# Patient Record
Sex: MALE | Race: WHITE | NOT HISPANIC OR LATINO | Employment: FULL TIME | URBAN - METROPOLITAN AREA
[De-identification: names, ages, dates, MRNs, and addresses within clinical notes are randomized per-mention and may not be internally consistent; named-entity substitution may affect disease eponyms.]

---

## 2018-11-02 ENCOUNTER — TELEPHONE (OUTPATIENT)
Dept: FAMILY MEDICINE CLINIC | Facility: CLINIC | Age: 48
End: 2018-11-02

## 2018-11-06 ENCOUNTER — TELEPHONE (OUTPATIENT)
Dept: FAMILY MEDICINE CLINIC | Facility: CLINIC | Age: 48
End: 2018-11-06

## 2018-11-07 DIAGNOSIS — Z00.00 ROUTINE HEALTH MAINTENANCE: Primary | ICD-10-CM

## 2018-11-07 DIAGNOSIS — E55.9 VITAMIN D DEFICIENCY: ICD-10-CM

## 2018-11-07 DIAGNOSIS — Z12.5 SPECIAL SCREENING FOR MALIGNANT NEOPLASM OF PROSTATE: ICD-10-CM

## 2018-11-07 PROBLEM — K52.9 CHRONIC DIARRHEA: Status: ACTIVE | Noted: 2018-11-07

## 2018-11-07 PROBLEM — K21.9 ACID REFLUX: Status: ACTIVE | Noted: 2018-11-07

## 2018-11-07 PROBLEM — J30.2 ALLERGIC RHINITIS, SEASONAL: Status: ACTIVE | Noted: 2018-11-07

## 2018-11-27 LAB
25(OH)D3+25(OH)D2 SERPL-MCNC: 21.6 NG/ML (ref 30–100)
ALBUMIN SERPL-MCNC: 4.4 G/DL (ref 3.5–5.5)
ALBUMIN/GLOB SERPL: 1.5 {RATIO} (ref 1.2–2.2)
ALP SERPL-CCNC: 69 IU/L (ref 39–117)
ALT SERPL-CCNC: 14 IU/L (ref 0–44)
AST SERPL-CCNC: 20 IU/L (ref 0–40)
BASOPHILS # BLD AUTO: 0 X10E3/UL (ref 0–0.2)
BASOPHILS NFR BLD AUTO: 0 %
BILIRUB SERPL-MCNC: 0.3 MG/DL (ref 0–1.2)
BUN SERPL-MCNC: 16 MG/DL (ref 6–24)
BUN/CREAT SERPL: 16 (ref 9–20)
CALCIUM SERPL-MCNC: 9.1 MG/DL (ref 8.7–10.2)
CHLORIDE SERPL-SCNC: 105 MMOL/L (ref 96–106)
CHOLEST SERPL-MCNC: 218 MG/DL (ref 100–199)
CO2 SERPL-SCNC: 22 MMOL/L (ref 20–29)
CREAT SERPL-MCNC: 1 MG/DL (ref 0.76–1.27)
EOSINOPHIL # BLD AUTO: 0.7 X10E3/UL (ref 0–0.4)
EOSINOPHIL NFR BLD AUTO: 10 %
ERYTHROCYTE [DISTWIDTH] IN BLOOD BY AUTOMATED COUNT: 13.1 % (ref 12.3–15.4)
GLOBULIN SER CALC-MCNC: 2.9 G/DL (ref 1.5–4.5)
GLUCOSE SERPL-MCNC: 121 MG/DL (ref 65–99)
HCT VFR BLD AUTO: 42.7 % (ref 37.5–51)
HDLC SERPL-MCNC: 39 MG/DL
HGB BLD-MCNC: 14.8 G/DL (ref 13–17.7)
IMM GRANULOCYTES # BLD: 0 X10E3/UL (ref 0–0.1)
IMM GRANULOCYTES NFR BLD: 0 %
LDLC SERPL CALC-MCNC: 145 MG/DL (ref 0–99)
LYMPHOCYTES # BLD AUTO: 1.5 X10E3/UL (ref 0.7–3.1)
LYMPHOCYTES NFR BLD AUTO: 21 %
MCH RBC QN AUTO: 29.6 PG (ref 26.6–33)
MCHC RBC AUTO-ENTMCNC: 34.7 G/DL (ref 31.5–35.7)
MCV RBC AUTO: 85 FL (ref 79–97)
MONOCYTES # BLD AUTO: 0.7 X10E3/UL (ref 0.1–0.9)
MONOCYTES NFR BLD AUTO: 9 %
NEUTROPHILS # BLD AUTO: 4.1 X10E3/UL (ref 1.4–7)
NEUTROPHILS NFR BLD AUTO: 60 %
PLATELET # BLD AUTO: 208 X10E3/UL (ref 150–379)
POTASSIUM SERPL-SCNC: 4.5 MMOL/L (ref 3.5–5.2)
PROT SERPL-MCNC: 7.3 G/DL (ref 6–8.5)
PSA SERPL-MCNC: 2.1 NG/ML (ref 0–4)
RBC # BLD AUTO: 5 X10E6/UL (ref 4.14–5.8)
SODIUM SERPL-SCNC: 142 MMOL/L (ref 134–144)
TRIGL SERPL-MCNC: 171 MG/DL (ref 0–149)
VLDLC SERPL CALC-MCNC: 34 MG/DL (ref 5–40)
WBC # BLD AUTO: 7 X10E3/UL (ref 3.4–10.8)

## 2018-11-28 ENCOUNTER — OFFICE VISIT (OUTPATIENT)
Dept: FAMILY MEDICINE CLINIC | Facility: CLINIC | Age: 48
End: 2018-11-28

## 2018-11-28 VITALS
SYSTOLIC BLOOD PRESSURE: 110 MMHG | OXYGEN SATURATION: 96 % | RESPIRATION RATE: 16 BRPM | HEIGHT: 73 IN | DIASTOLIC BLOOD PRESSURE: 64 MMHG | BODY MASS INDEX: 32.2 KG/M2 | WEIGHT: 243 LBS | HEART RATE: 74 BPM | TEMPERATURE: 98 F

## 2018-11-28 DIAGNOSIS — R73.02 GLUCOSE INTOLERANCE (IMPAIRED GLUCOSE TOLERANCE): ICD-10-CM

## 2018-11-28 DIAGNOSIS — E66.09 EXOGENOUS OBESITY: ICD-10-CM

## 2018-11-28 DIAGNOSIS — E55.9 VITAMIN D DEFICIENCY: ICD-10-CM

## 2018-11-28 DIAGNOSIS — E78.2 MIXED HYPERLIPIDEMIA: Primary | ICD-10-CM

## 2018-11-28 LAB
HBA1C MFR BLD: 5.4 % (ref 4.8–5.6)
Lab: NORMAL
WRITTEN AUTHORIZATION: NORMAL

## 2018-11-28 PROCEDURE — 99203 OFFICE O/P NEW LOW 30 MIN: CPT | Performed by: FAMILY MEDICINE

## 2018-11-28 NOTE — PATIENT INSTRUCTIONS
Results for orders placed or performed in visit on 11/07/18   Comprehensive Metabolic Panel   Result Value Ref Range    Glucose 121 (H) 65 - 99 mg/dL    BUN 16 6 - 24 mg/dL    Creatinine 1.00 0.76 - 1.27 mg/dL    eGFR Non African Am 89 >59 mL/min/1.73    eGFR African Am 103 >59 mL/min/1.73    BUN/Creatinine Ratio 16 9 - 20    Sodium 142 134 - 144 mmol/L    Potassium 4.5 3.5 - 5.2 mmol/L    Chloride 105 96 - 106 mmol/L    Total CO2 22 20 - 29 mmol/L    Calcium 9.1 8.7 - 10.2 mg/dL    Total Protein 7.3 6.0 - 8.5 g/dL    Albumin 4.4 3.5 - 5.5 g/dL    Globulin 2.9 1.5 - 4.5 g/dL    A/G Ratio 1.5 1.2 - 2.2    Total Bilirubin 0.3 0.0 - 1.2 mg/dL    Alkaline Phosphatase 69 39 - 117 IU/L    AST (SGOT) 20 0 - 40 IU/L    ALT (SGPT) 14 0 - 44 IU/L   Lipid Panel   Result Value Ref Range    Total Cholesterol 218 (H) 100 - 199 mg/dL    Triglycerides 171 (H) 0 - 149 mg/dL    HDL Cholesterol 39 (L) >39 mg/dL    VLDL Cholesterol 34 5 - 40 mg/dL    LDL Cholesterol  145 (H) 0 - 99 mg/dL   PSA DIAGNOSTIC   Result Value Ref Range    PSA 2.1 0.0 - 4.0 ng/mL   Vitamin D 25 Hydroxy   Result Value Ref Range    25 Hydroxy, Vitamin D 21.6 (L) 30.0 - 100.0 ng/mL   Hemoglobin A1c   Result Value Ref Range    Hemoglobin A1C 5.4 4.8 - 5.6 %   Please Note   Result Value Ref Range    Please note Comment    Written Authorization   Result Value Ref Range    Written Authorization Comment    CBC & Differential   Result Value Ref Range    WBC 7.0 3.4 - 10.8 x10E3/uL    RBC 5.00 4.14 - 5.80 x10E6/uL    Hemoglobin 14.8 13.0 - 17.7 g/dL    Hematocrit 42.7 37.5 - 51.0 %    MCV 85 79 - 97 fL    MCH 29.6 26.6 - 33.0 pg    MCHC 34.7 31.5 - 35.7 g/dL    RDW 13.1 12.3 - 15.4 %    Platelets 208 150 - 379 x10E3/uL    Neutrophil Rel % 60 Not Estab. %    Lymphocyte Rel % 21 Not Estab. %    Monocyte Rel % 9 Not Estab. %    Eosinophil Rel % 10 Not Estab. %    Basophil Rel % 0 Not Estab. %    Neutrophils Absolute 4.1 1.4 - 7.0 x10E3/uL    Lymphocytes Absolute 1.5 0.7 -  3.1 x10E3/uL    Monocytes Absolute 0.7 0.1 - 0.9 x10E3/uL    Eosinophils Absolute 0.7 (H) 0.0 - 0.4 x10E3/uL    Basophils Absolute 0.0 0.0 - 0.2 x10E3/uL    Immature Granulocyte Rel % 0 Not Estab. %    Immature Grans Absolute 0.0 0.0 - 0.1 x10E3/uL     Consider starting Vitamin D3 5000 IU daily.

## 2018-11-28 NOTE — PROGRESS NOTES
Subjective   Niels Owens is a 48 y.o. male with   Chief Complaint   Patient presents with   • Establish Care     re-establish   .    History of Present Illness     47 yo white male presents to reestablish care and follow up on recent fasting labwork.  He does admit he was not fasting at the time of his blood draw.  Pt did complete a patient history form, it has been reviewed and discussed in its entirety.  His mother had breast cancer and his father had gallbladder and kidney cancer.  His brother and father both had HTN.  Pt does have a history of exogenous obesity.  Jcarlos states he may drink 1-2 drinks on a weekend.  Niels states he has had a feeling of an upset stomach for the last week.  He has had no diarrhea or vomiting.  Bowel movements have been normal with formed brown stool.  There has been no fever either.      The following portions of the patient's history were reviewed and updated as appropriate: allergies, current medications, past family history, past medical history, past social history, past surgical history and problem list.    Review of Systems   Constitutional:        Exogenous Obesity   Cardiovascular: Negative for chest pain, palpitations and leg swelling.        HLD   Gastrointestinal: Positive for abdominal pain. Negative for constipation, diarrhea, nausea and vomiting.   Endocrine: Negative for cold intolerance and heat intolerance.        Vit D Def   All other systems reviewed and are negative.      Objective     Vitals:    11/28/18 1540   BP: 110/64   Pulse: 74   Resp: 16   Temp: 98 °F (36.7 °C)   SpO2: 96%     BP Readings from Last 3 Encounters:   11/28/18 110/64   03/21/16 136/81   09/04/15 116/60      Wt Readings from Last 3 Encounters:   11/28/18 110 kg (243 lb)   03/21/16 104 kg (230 lb)   09/04/15 100 kg (221 lb 0.2 oz)        Recent Results (from the past 168 hour(s))   CBC & Differential    Collection Time: 11/26/18  3:33 PM   Result Value Ref Range    WBC 7.0 3.4 - 10.8  x10E3/uL    RBC 5.00 4.14 - 5.80 x10E6/uL    Hemoglobin 14.8 13.0 - 17.7 g/dL    Hematocrit 42.7 37.5 - 51.0 %    MCV 85 79 - 97 fL    MCH 29.6 26.6 - 33.0 pg    MCHC 34.7 31.5 - 35.7 g/dL    RDW 13.1 12.3 - 15.4 %    Platelets 208 150 - 379 x10E3/uL    Neutrophil Rel % 60 Not Estab. %    Lymphocyte Rel % 21 Not Estab. %    Monocyte Rel % 9 Not Estab. %    Eosinophil Rel % 10 Not Estab. %    Basophil Rel % 0 Not Estab. %    Neutrophils Absolute 4.1 1.4 - 7.0 x10E3/uL    Lymphocytes Absolute 1.5 0.7 - 3.1 x10E3/uL    Monocytes Absolute 0.7 0.1 - 0.9 x10E3/uL    Eosinophils Absolute 0.7 (H) 0.0 - 0.4 x10E3/uL    Basophils Absolute 0.0 0.0 - 0.2 x10E3/uL    Immature Granulocyte Rel % 0 Not Estab. %    Immature Grans Absolute 0.0 0.0 - 0.1 x10E3/uL   Comprehensive Metabolic Panel    Collection Time: 11/26/18  3:33 PM   Result Value Ref Range    Glucose 121 (H) 65 - 99 mg/dL    BUN 16 6 - 24 mg/dL    Creatinine 1.00 0.76 - 1.27 mg/dL    eGFR Non African Am 89 >59 mL/min/1.73    eGFR African Am 103 >59 mL/min/1.73    BUN/Creatinine Ratio 16 9 - 20    Sodium 142 134 - 144 mmol/L    Potassium 4.5 3.5 - 5.2 mmol/L    Chloride 105 96 - 106 mmol/L    Total CO2 22 20 - 29 mmol/L    Calcium 9.1 8.7 - 10.2 mg/dL    Total Protein 7.3 6.0 - 8.5 g/dL    Albumin 4.4 3.5 - 5.5 g/dL    Globulin 2.9 1.5 - 4.5 g/dL    A/G Ratio 1.5 1.2 - 2.2    Total Bilirubin 0.3 0.0 - 1.2 mg/dL    Alkaline Phosphatase 69 39 - 117 IU/L    AST (SGOT) 20 0 - 40 IU/L    ALT (SGPT) 14 0 - 44 IU/L   Lipid Panel    Collection Time: 11/26/18  3:33 PM   Result Value Ref Range    Total Cholesterol 218 (H) 100 - 199 mg/dL    Triglycerides 171 (H) 0 - 149 mg/dL    HDL Cholesterol 39 (L) >39 mg/dL    VLDL Cholesterol 34 5 - 40 mg/dL    LDL Cholesterol  145 (H) 0 - 99 mg/dL   PSA DIAGNOSTIC    Collection Time: 11/26/18  3:33 PM   Result Value Ref Range    PSA 2.1 0.0 - 4.0 ng/mL   Vitamin D 25 Hydroxy    Collection Time: 11/26/18  3:33 PM   Result Value Ref Range     25 Hydroxy, Vitamin D 21.6 (L) 30.0 - 100.0 ng/mL   Hemoglobin A1c    Collection Time: 11/26/18  3:33 PM   Result Value Ref Range    Hemoglobin A1C 5.4 4.8 - 5.6 %   Please Note    Collection Time: 11/26/18  3:33 PM   Result Value Ref Range    Please note Comment    Written Authorization    Collection Time: 11/26/18  3:33 PM   Result Value Ref Range    Written Authorization Comment        Physical Exam   Constitutional: He is oriented to person, place, and time. He appears well-developed and well-nourished.   HENT:   Head: Normocephalic and atraumatic.   Neck: Trachea normal and phonation normal. Neck supple. Normal carotid pulses present. Carotid bruit is not present. No thyroid mass and no thyromegaly present.   Cardiovascular: Normal rate, regular rhythm and normal heart sounds. Exam reveals no gallop and no friction rub.   No murmur heard.  Pulmonary/Chest: Effort normal and breath sounds normal. No respiratory distress. He has no decreased breath sounds. He has no wheezes. He has no rhonchi. He has no rales.   Abdominal: Soft. Normal appearance and bowel sounds are normal. He exhibits no distension and no mass. There is no hepatosplenomegaly. There is no tenderness. There is no rigidity, no rebound, no guarding and no CVA tenderness. No hernia.   Lymphadenopathy:     He has no cervical adenopathy.   Neurological: He is alert and oriented to person, place, and time.   Skin: Skin is warm and dry. No rash noted.   Psychiatric: He has a normal mood and affect. His speech is normal and behavior is normal. Judgment and thought content normal. Cognition and memory are normal.   Nursing note and vitals reviewed.      Assessment/Plan   Niels was seen today for establish care.    Diagnoses and all orders for this visit:    Mixed hyperlipidemia  -     CBC & Differential; Future  -     Comprehensive Metabolic Panel; Future  -     Lipid Panel; Future    Vitamin D deficiency  -     Vitamin D 25 Hydroxy; Future    Exogenous  obesity    Glucose intolerance (impaired glucose tolerance)      Patient Instructions     Results for orders placed or performed in visit on 11/07/18   Comprehensive Metabolic Panel   Result Value Ref Range    Glucose 121 (H) 65 - 99 mg/dL    BUN 16 6 - 24 mg/dL    Creatinine 1.00 0.76 - 1.27 mg/dL    eGFR Non African Am 89 >59 mL/min/1.73    eGFR African Am 103 >59 mL/min/1.73    BUN/Creatinine Ratio 16 9 - 20    Sodium 142 134 - 144 mmol/L    Potassium 4.5 3.5 - 5.2 mmol/L    Chloride 105 96 - 106 mmol/L    Total CO2 22 20 - 29 mmol/L    Calcium 9.1 8.7 - 10.2 mg/dL    Total Protein 7.3 6.0 - 8.5 g/dL    Albumin 4.4 3.5 - 5.5 g/dL    Globulin 2.9 1.5 - 4.5 g/dL    A/G Ratio 1.5 1.2 - 2.2    Total Bilirubin 0.3 0.0 - 1.2 mg/dL    Alkaline Phosphatase 69 39 - 117 IU/L    AST (SGOT) 20 0 - 40 IU/L    ALT (SGPT) 14 0 - 44 IU/L   Lipid Panel   Result Value Ref Range    Total Cholesterol 218 (H) 100 - 199 mg/dL    Triglycerides 171 (H) 0 - 149 mg/dL    HDL Cholesterol 39 (L) >39 mg/dL    VLDL Cholesterol 34 5 - 40 mg/dL    LDL Cholesterol  145 (H) 0 - 99 mg/dL   PSA DIAGNOSTIC   Result Value Ref Range    PSA 2.1 0.0 - 4.0 ng/mL   Vitamin D 25 Hydroxy   Result Value Ref Range    25 Hydroxy, Vitamin D 21.6 (L) 30.0 - 100.0 ng/mL   Hemoglobin A1c   Result Value Ref Range    Hemoglobin A1C 5.4 4.8 - 5.6 %   Please Note   Result Value Ref Range    Please note Comment    Written Authorization   Result Value Ref Range    Written Authorization Comment    CBC & Differential   Result Value Ref Range    WBC 7.0 3.4 - 10.8 x10E3/uL    RBC 5.00 4.14 - 5.80 x10E6/uL    Hemoglobin 14.8 13.0 - 17.7 g/dL    Hematocrit 42.7 37.5 - 51.0 %    MCV 85 79 - 97 fL    MCH 29.6 26.6 - 33.0 pg    MCHC 34.7 31.5 - 35.7 g/dL    RDW 13.1 12.3 - 15.4 %    Platelets 208 150 - 379 x10E3/uL    Neutrophil Rel % 60 Not Estab. %    Lymphocyte Rel % 21 Not Estab. %    Monocyte Rel % 9 Not Estab. %    Eosinophil Rel % 10 Not Estab. %    Basophil Rel % 0 Not  Estab. %    Neutrophils Absolute 4.1 1.4 - 7.0 x10E3/uL    Lymphocytes Absolute 1.5 0.7 - 3.1 x10E3/uL    Monocytes Absolute 0.7 0.1 - 0.9 x10E3/uL    Eosinophils Absolute 0.7 (H) 0.0 - 0.4 x10E3/uL    Basophils Absolute 0.0 0.0 - 0.2 x10E3/uL    Immature Granulocyte Rel % 0 Not Estab. %    Immature Grans Absolute 0.0 0.0 - 0.1 x10E3/uL     Consider starting Vitamin D3 5000 IU daily.      Return in about 6 months (around 5/28/2019).    Scribed for Guerrero Powell MD by Paula Barron CMA. 11/28/2018    I, Guerrero Powell MD personally performed the services described in this documentation, as scribed by Paula Barron CMA in my presence, and it is both accurate and complete

## 2018-11-29 PROBLEM — R73.02 GLUCOSE INTOLERANCE (IMPAIRED GLUCOSE TOLERANCE): Status: ACTIVE | Noted: 2018-11-29

## 2019-06-28 ENCOUNTER — RESULTS ENCOUNTER (OUTPATIENT)
Dept: FAMILY MEDICINE CLINIC | Facility: CLINIC | Age: 49
End: 2019-06-28

## 2019-06-28 DIAGNOSIS — E78.2 MIXED HYPERLIPIDEMIA: ICD-10-CM

## 2019-06-28 DIAGNOSIS — E55.9 VITAMIN D DEFICIENCY: ICD-10-CM

## 2022-01-20 ENCOUNTER — TELEPHONE (OUTPATIENT)
Dept: FAMILY MEDICINE CLINIC | Facility: CLINIC | Age: 52
End: 2022-01-20

## 2022-01-20 NOTE — TELEPHONE ENCOUNTER
Caller: Niels Owens    Relationship: Self    Best call back number: [phone]    Requested Prescriptions:   ANTIBIOTIC, COUGH MEDICATION  AND STEROID PACK     Pharmacy where request should be sent:    [facility] [address] [place] [address] [address] - [phone]  - [phone] FX  [phone]  Additional details provided by patient: PATIENT IS CALLING TO STATE HE TESTED POSITIVE FOR COVID YESTERDAY.  HE STATES HE HAS CHEST CONGESTION, COUGH, NAUSEA, SORE THROAT.  HE STATES HE HAS HAD FEVER BUT NOT TODAY.    Does the patient have less than a 3 day supply:  [x] Yes  [] No    Anuradha Joaquin, Scotted Rep   01/20/22 09:34 EST       PLEASE ADVISE.

## 2022-02-03 ENCOUNTER — OFFICE VISIT (OUTPATIENT)
Dept: FAMILY MEDICINE CLINIC | Facility: CLINIC | Age: 52
End: 2022-02-03

## 2022-02-03 VITALS
SYSTOLIC BLOOD PRESSURE: 120 MMHG | HEIGHT: 71 IN | BODY MASS INDEX: 33.46 KG/M2 | TEMPERATURE: 97.5 F | WEIGHT: 239 LBS | DIASTOLIC BLOOD PRESSURE: 82 MMHG | OXYGEN SATURATION: 98 % | HEART RATE: 86 BPM

## 2022-02-03 DIAGNOSIS — R73.02 GLUCOSE INTOLERANCE (IMPAIRED GLUCOSE TOLERANCE): ICD-10-CM

## 2022-02-03 DIAGNOSIS — E55.9 VITAMIN D DEFICIENCY: ICD-10-CM

## 2022-02-03 DIAGNOSIS — N40.0 BENIGN PROSTATIC HYPERPLASIA WITHOUT LOWER URINARY TRACT SYMPTOMS: ICD-10-CM

## 2022-02-03 DIAGNOSIS — Z00.01 ENCOUNTER FOR WELL ADULT EXAM WITH ABNORMAL FINDINGS: Primary | ICD-10-CM

## 2022-02-03 DIAGNOSIS — E66.09 EXOGENOUS OBESITY: ICD-10-CM

## 2022-02-03 DIAGNOSIS — Z00.00 HEALTHCARE MAINTENANCE: ICD-10-CM

## 2022-02-03 LAB
DEVELOPER EXPIRATION DATE: ABNORMAL
DEVELOPER LOT NUMBER: ABNORMAL
EXPIRATION DATE: ABNORMAL
FECAL OCCULT BLOOD SCREEN, POC: POSITIVE
Lab: ABNORMAL
NEGATIVE CONTROL: NEGATIVE
POSITIVE CONTROL: POSITIVE

## 2022-02-03 PROCEDURE — 99386 PREV VISIT NEW AGE 40-64: CPT | Performed by: FAMILY MEDICINE

## 2022-02-03 PROCEDURE — 82270 OCCULT BLOOD FECES: CPT | Performed by: FAMILY MEDICINE

## 2022-02-03 NOTE — PROGRESS NOTES
Subjective   Niels Owens is a 51 y.o. male with   Chief Complaint   Patient presents with   • Annual Exam   .    History of Present Illness   51-year-old white male here for routine complete physical exam has been quite sometime since his last evaluation and fasting labs were not performed prior to this visit.  He does get some labs drawn at his workplace but admits that its not the entire picture.  Patient has had colonoscopy in 2018 with benign findings-no polyps.  He has no family history of colon cancer and therefore is not due till 2028.  Patient is currently not fasting today.  Past medical history is rather sparse including seasonal allergic rhinitis, vitamin D deficiency as well as glucose intolerance.  He uses no prescriptive medications.  In general he is doing well and has no acute complaints.  The following portions of the patient's history were reviewed and updated as appropriate: allergies, current medications, past family history, past medical history, past social history, past surgical history and problem list.    Review of Systems   Endocrine:        Glucose intolerance, vitamin D deficiency   Allergic/Immunologic: Positive for environmental allergies.   All other systems reviewed and are negative.      Objective     Vitals:    02/03/22 1014   BP: 120/82   Pulse: 86   Temp: 97.5 °F (36.4 °C)   SpO2: 98%       Recent Results (from the past 672 hour(s))   POC Occult Blood Stool    Collection Time: 02/03/22 11:36 AM    Specimen: Stool   Result Value Ref Range    Fecal Occult Blood Positive (A) Negative    Lot Number [id number]     Expiration Date 9/23/2023     DEVELOPER LOT NUMBER [id number]     DEVELOPER EXPIRATION DATE 9/30/2023     Positive Control Positive Positive    Negative Control Negative Negative       Physical Exam  Vitals and nursing note reviewed.   Constitutional:       General: He is not in acute distress.     Appearance: Normal appearance. He is well-developed and well-groomed. He is  obese.      Comments: Exogenous obesity with a BMI of 33.3   HENT:      Head: Normocephalic and atraumatic.      Right Ear: Hearing, tympanic membrane, ear canal and external ear normal.      Left Ear: Hearing, tympanic membrane, ear canal and external ear normal.      Nose: Nose normal.      Mouth/Throat:      Lips: Pink.      Mouth: Mucous membranes are moist.      Dentition: Normal dentition.      Tongue: No lesions. Tongue does not deviate from midline.      Palate: No mass and lesions.      Pharynx: Oropharynx is clear. Uvula midline.   Eyes:      General: Lids are normal. No scleral icterus.     Extraocular Movements: Extraocular movements intact.      Conjunctiva/sclera: Conjunctivae normal.      Pupils: Pupils are equal, round, and reactive to light.      Funduscopic exam:     Right eye: No hemorrhage, exudate, AV nicking or papilledema.         Left eye: No hemorrhage, exudate, AV nicking or papilledema.   Neck:      Thyroid: No thyroid mass or thyromegaly.      Vascular: No carotid bruit or JVD.      Trachea: Trachea normal.   Cardiovascular:      Rate and Rhythm: Normal rate and regular rhythm.      Chest Wall: PMI is not displaced.      Pulses: Normal pulses.           Carotid pulses are 2+ on the right side and 2+ on the left side.       Radial pulses are 2+ on the right side and 2+ on the left side.      Heart sounds: Normal heart sounds, S1 normal and S2 normal. No murmur heard.  No friction rub. No gallop.    Pulmonary:      Effort: Pulmonary effort is normal.      Breath sounds: Normal breath sounds. No decreased breath sounds, wheezing, rhonchi or rales.   Abdominal:      General: Abdomen is flat. Bowel sounds are normal. There is no distension.      Palpations: Abdomen is soft. Abdomen is not rigid. There is no hepatomegaly, splenomegaly or mass.      Tenderness: There is no abdominal tenderness. There is no right CVA tenderness, left CVA tenderness, guarding or rebound.      Hernia: No hernia is  present. There is no hernia in the left inguinal area or right inguinal area.   Genitourinary:     Penis: Normal and circumcised.       Testes: Normal. Cremasteric reflex is present.      Epididymis:      Right: Normal.      Left: Normal.      Prostate: Enlarged. Not tender and no nodules present.      Rectum: Normal. Guaiac result negative.   Musculoskeletal:         General: No tenderness or deformity. Normal range of motion.      Cervical back: Normal range of motion and neck supple. No muscular tenderness. Normal range of motion.   Lymphadenopathy:      Cervical: No cervical adenopathy.   Skin:     General: Skin is warm and dry.      Findings: No rash.   Neurological:      Mental Status: He is alert and oriented to person, place, and time.      Cranial Nerves: Cranial nerves are intact. No cranial nerve deficit.      Sensory: Sensation is intact. No sensory deficit.      Motor: Motor function is intact. No tremor or abnormal muscle tone.      Coordination: Coordination is intact. Coordination normal.      Gait: Gait is intact. Gait normal.      Deep Tendon Reflexes: Reflexes are normal and symmetric. Reflexes normal.      Reflex Scores:       Bicep reflexes are 2+ on the right side and 2+ on the left side.       Brachioradialis reflexes are 2+ on the right side and 2+ on the left side.       Patellar reflexes are 2+ on the right side and 2+ on the left side.  Psychiatric:         Attention and Perception: Attention and perception normal.         Mood and Affect: Mood and affect normal.         Speech: Speech normal.         Behavior: Behavior normal. Behavior is cooperative.         Thought Content: Thought content normal.         Cognition and Memory: Cognition and memory normal.         Judgment: Judgment normal.         Assessment/Plan   Diagnoses and all orders for this visit:    1. Encounter for well adult exam with abnormal findings (Primary)  -     Comprehensive metabolic panel; Future  -     PSA  DIAGNOSTIC ONLY; Future  -     Vitamin D 25 hydroxy; Future  -     TSH; Future  -     Lipid panel; Future  -     Hemoglobin A1c; Future  -     CBC w AUTO Differential; Future    2. Healthcare maintenance  -     POC Occult Blood Stool  -     Comprehensive metabolic panel; Future  -     PSA DIAGNOSTIC ONLY; Future  -     Vitamin D 25 hydroxy; Future  -     TSH; Future  -     Lipid panel; Future  -     Hemoglobin A1c; Future  -     CBC w AUTO Differential; Future    3. Exogenous obesity  -     Comprehensive metabolic panel; Future  -     PSA DIAGNOSTIC ONLY; Future  -     Vitamin D 25 hydroxy; Future  -     TSH; Future  -     Lipid panel; Future  -     Hemoglobin A1c; Future  -     CBC w AUTO Differential; Future    4. Glucose intolerance (impaired glucose tolerance)  -     Comprehensive metabolic panel; Future  -     PSA DIAGNOSTIC ONLY; Future  -     Vitamin D 25 hydroxy; Future  -     TSH; Future  -     Lipid panel; Future  -     Hemoglobin A1c; Future  -     CBC w AUTO Differential; Future    5. Vitamin D deficiency  -     Comprehensive metabolic panel; Future  -     PSA DIAGNOSTIC ONLY; Future  -     Vitamin D 25 hydroxy; Future  -     TSH; Future  -     Lipid panel; Future  -     Hemoglobin A1c; Future  -     CBC w AUTO Differential; Future    6. Benign prostatic hyperplasia without lower urinary tract symptoms  -     Comprehensive metabolic panel; Future  -     PSA DIAGNOSTIC ONLY; Future  -     Vitamin D 25 hydroxy; Future  -     TSH; Future  -     Lipid panel; Future  -     Hemoglobin A1c; Future  -     CBC w AUTO Differential; Future    Weight loss would be beneficial in regards to his sugar status and patient has been asked to start vitamin D3 at 5000 IUs/day.  He is also been advised to wear seatbelt while driving and to wear a bicycle helmet while riding a bicycle.    Return in about 1 year (around 2/3/2023), or if symptoms worsen or fail to improve, for Annual.

## 2022-02-09 DIAGNOSIS — E55.9 VITAMIN D DEFICIENCY: ICD-10-CM

## 2022-02-09 DIAGNOSIS — N40.0 BENIGN PROSTATIC HYPERPLASIA WITHOUT LOWER URINARY TRACT SYMPTOMS: ICD-10-CM

## 2022-02-09 DIAGNOSIS — R73.02 GLUCOSE INTOLERANCE (IMPAIRED GLUCOSE TOLERANCE): ICD-10-CM

## 2022-02-09 DIAGNOSIS — Z00.00 HEALTHCARE MAINTENANCE: ICD-10-CM

## 2022-02-09 DIAGNOSIS — Z00.01 ENCOUNTER FOR WELL ADULT EXAM WITH ABNORMAL FINDINGS: ICD-10-CM

## 2022-02-09 DIAGNOSIS — E66.09 EXOGENOUS OBESITY: ICD-10-CM

## 2022-02-11 LAB
25(OH)D3+25(OH)D2 SERPL-MCNC: 22.7 NG/ML (ref 30–100)
ALBUMIN SERPL-MCNC: 4.2 G/DL (ref 4–5)
ALBUMIN/GLOB SERPL: 1.4 {RATIO} (ref 1.2–2.2)
ALP SERPL-CCNC: 56 IU/L (ref 44–121)
ALT SERPL-CCNC: 16 IU/L (ref 0–44)
AST SERPL-CCNC: 21 IU/L (ref 0–40)
BASOPHILS # BLD AUTO: 0 X10E3/UL (ref 0–0.2)
BASOPHILS NFR BLD AUTO: 1 %
BILIRUB SERPL-MCNC: 0.4 MG/DL (ref 0–1.2)
BUN SERPL-MCNC: 13 MG/DL (ref 6–24)
BUN/CREAT SERPL: 12 (ref 9–20)
CALCIUM SERPL-MCNC: 9.6 MG/DL (ref 8.7–10.2)
CHLORIDE SERPL-SCNC: 103 MMOL/L (ref 96–106)
CHOLEST SERPL-MCNC: 220 MG/DL (ref 100–199)
CO2 SERPL-SCNC: 21 MMOL/L (ref 20–29)
CREAT SERPL-MCNC: 1.11 MG/DL (ref 0.76–1.27)
EOSINOPHIL # BLD AUTO: 0.2 X10E3/UL (ref 0–0.4)
EOSINOPHIL NFR BLD AUTO: 4 %
ERYTHROCYTE [DISTWIDTH] IN BLOOD BY AUTOMATED COUNT: 12.5 % (ref 11.6–15.4)
GLOBULIN SER CALC-MCNC: 3.1 G/DL (ref 1.5–4.5)
GLUCOSE SERPL-MCNC: 102 MG/DL (ref 65–99)
HBA1C MFR BLD: 5.8 % (ref 4.8–5.6)
HCT VFR BLD AUTO: 43.7 % (ref 37.5–51)
HDLC SERPL-MCNC: 43 MG/DL
HGB BLD-MCNC: 15.4 G/DL (ref 13–17.7)
IMM GRANULOCYTES # BLD AUTO: 0 X10E3/UL (ref 0–0.1)
IMM GRANULOCYTES NFR BLD AUTO: 0 %
LDLC SERPL CALC-MCNC: 136 MG/DL (ref 0–99)
LYMPHOCYTES # BLD AUTO: 1.3 X10E3/UL (ref 0.7–3.1)
LYMPHOCYTES NFR BLD AUTO: 26 %
MCH RBC QN AUTO: 30.5 PG (ref 26.6–33)
MCHC RBC AUTO-ENTMCNC: 35.2 G/DL (ref 31.5–35.7)
MCV RBC AUTO: 87 FL (ref 79–97)
MONOCYTES # BLD AUTO: 0.5 X10E3/UL (ref 0.1–0.9)
MONOCYTES NFR BLD AUTO: 11 %
NEUTROPHILS # BLD AUTO: 2.9 X10E3/UL (ref 1.4–7)
NEUTROPHILS NFR BLD AUTO: 58 %
PLATELET # BLD AUTO: 225 X10E3/UL (ref 150–450)
POTASSIUM SERPL-SCNC: 4.8 MMOL/L (ref 3.5–5.2)
PROT SERPL-MCNC: 7.3 G/DL (ref 6–8.5)
PSA SERPL-MCNC: 2.4 NG/ML (ref 0–4)
RBC # BLD AUTO: 5.05 X10E6/UL (ref 4.14–5.8)
SODIUM SERPL-SCNC: 140 MMOL/L (ref 134–144)
TRIGL SERPL-MCNC: 227 MG/DL (ref 0–149)
TSH SERPL DL<=0.005 MIU/L-ACNC: 1.52 UIU/ML (ref 0.45–4.5)
VLDLC SERPL CALC-MCNC: 41 MG/DL (ref 5–40)
WBC # BLD AUTO: 5 X10E3/UL (ref 3.4–10.8)

## 2022-02-21 ENCOUNTER — HOSPITAL ENCOUNTER (OUTPATIENT)
Dept: GENERAL RADIOLOGY | Facility: HOSPITAL | Age: 52
Discharge: HOME | End: 2022-02-21
Admitting: FAMILY MEDICINE

## 2022-02-21 ENCOUNTER — OFFICE VISIT (OUTPATIENT)
Dept: FAMILY MEDICINE CLINIC | Facility: CLINIC | Age: 52
End: 2022-02-21

## 2022-02-21 VITALS
HEIGHT: 71 IN | OXYGEN SATURATION: 98 % | WEIGHT: 250 LBS | HEART RATE: 73 BPM | BODY MASS INDEX: 35 KG/M2 | TEMPERATURE: 96.9 F | SYSTOLIC BLOOD PRESSURE: 120 MMHG | DIASTOLIC BLOOD PRESSURE: 76 MMHG

## 2022-02-21 DIAGNOSIS — Z86.16 PERSONAL HISTORY OF COVID-19: Primary | ICD-10-CM

## 2022-02-21 DIAGNOSIS — Z86.16 PERSONAL HISTORY OF COVID-19: ICD-10-CM

## 2022-02-21 PROCEDURE — 99213 OFFICE O/P EST LOW 20 MIN: CPT | Performed by: FAMILY MEDICINE

## 2022-02-21 PROCEDURE — 71046 X-RAY EXAM CHEST 2 VIEWS: CPT

## 2022-02-21 RX ORDER — ALBUTEROL SULFATE 90 UG/1
2 AEROSOL, METERED RESPIRATORY (INHALATION) EVERY 4 HOURS PRN
Qty: 18 G | Refills: 0 | Status: SHIPPED | OUTPATIENT
Start: 2022-02-21

## 2022-02-21 NOTE — PROGRESS NOTES
"  Subjective   Niels Owens is a 51 y.o. male who is here for   Chief Complaint   Patient presents with   • Fatigue     covid positive 1/19 - short of breath with exertion    .     History of Present Illness   Mr. Owens comes in with persistent shortness of breath and dyspnea on exertion.  This started after he contracted COVID-19 in January.  His symptoms only lasted about 1 week of typical Covid symptoms.  But the shortness of breath continued on.  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  Use his wife's albuterol inhaler seem to help.  No cough.  No hemoptysis.  No chest pain.  He has no known cardiopulmonary issues.      The following portions of the patient's history were reviewed and updated as appropriate: allergies, current medications, past family history, past medical history, past social history, past surgical history and problem list.    Review of Systems    Objective   Vitals:    02/21/22 1302   BP: 120/76   BP Location: Left arm   Patient Position: Sitting   Cuff Size: Adult   Pulse: 73   Temp: 96.9 °F (36.1 °C)   SpO2: 98%   Weight: 113 kg (250 lb)   Height: 180.3 cm (71\")      Physical Exam  Vitals reviewed.   Cardiovascular:      Rate and Rhythm: Normal rate.      Heart sounds: No murmur heard.      Pulmonary:      Effort: Pulmonary effort is normal. No respiratory distress.      Breath sounds: No stridor. No wheezing, rhonchi or rales.   Neurological:      Mental Status: He is alert.         Assessment/Plan   Diagnoses and all orders for this visit:    1. Personal history of COVID-19 (Primary)  -     XR Chest 2 View; Future  -     Treadmill Stress Test; Future  -     albuterol sulfate  (90 Base) MCG/ACT inhaler; Inhale 2 puffs Every 4 (Four) Hours As Needed for Wheezing.  Dispense: 18 g; Refill: 0    Post Covid dyspnea.  We will send in a albuterol " inhaler  Chest x-ray today.  Treadmill EKG.  See him back in 3 weeks    There are no Patient Instructions on file for this visit.    There are no discontinued medications.     No follow-ups on file.    Dr. Kranthi Abbott  [place], [place]

## 2024-03-05 ENCOUNTER — OFFICE VISIT (OUTPATIENT)
Dept: FAMILY MEDICINE CLINIC | Facility: CLINIC | Age: 54
End: 2024-03-05
Payer: COMMERCIAL

## 2024-03-05 ENCOUNTER — HOSPITAL ENCOUNTER (OUTPATIENT)
Dept: GENERAL RADIOLOGY | Facility: HOSPITAL | Age: 54
Discharge: HOME | End: 2024-03-05
Admitting: NURSE PRACTITIONER
Payer: COMMERCIAL

## 2024-03-05 VITALS — WEIGHT: 237 LBS | HEIGHT: 71 IN | BODY MASS INDEX: 33.18 KG/M2 | OXYGEN SATURATION: 97 % | TEMPERATURE: 97.8 F

## 2024-03-05 DIAGNOSIS — R03.0 ELEVATED BLOOD PRESSURE READING IN OFFICE WITHOUT DIAGNOSIS OF HYPERTENSION: ICD-10-CM

## 2024-03-05 DIAGNOSIS — M54.16 LUMBAR RADICULOPATHY: ICD-10-CM

## 2024-03-05 DIAGNOSIS — R53.83 FATIGUE, UNSPECIFIED TYPE: ICD-10-CM

## 2024-03-05 DIAGNOSIS — R42 DIZZINESS: Primary | ICD-10-CM

## 2024-03-05 DIAGNOSIS — R06.02 SHORTNESS OF BREATH: ICD-10-CM

## 2024-03-05 PROCEDURE — 99214 OFFICE O/P EST MOD 30 MIN: CPT | Performed by: NURSE PRACTITIONER

## 2024-03-05 PROCEDURE — 93000 ELECTROCARDIOGRAM COMPLETE: CPT | Performed by: NURSE PRACTITIONER

## 2024-03-05 PROCEDURE — 71046 X-RAY EXAM CHEST 2 VIEWS: CPT

## 2024-03-05 RX ORDER — INDOMETHACIN 50 MG/1
50 CAPSULE ORAL 3 TIMES DAILY PRN
Qty: 30 CAPSULE | Refills: 0 | Status: SHIPPED | OUTPATIENT
Start: 2024-03-05

## 2024-03-05 RX ORDER — MELOXICAM 15 MG/1
15 TABLET ORAL
COMMUNITY
End: 2024-03-05 | Stop reason: ALTCHOICE

## 2024-03-05 RX ORDER — INDOMETHACIN 75 MG/1
75 CAPSULE, EXTENDED RELEASE ORAL 2 TIMES DAILY PRN
Qty: 30 CAPSULE | Refills: 0 | Status: CANCELLED | OUTPATIENT
Start: 2024-03-05

## 2024-03-05 RX ORDER — TIZANIDINE HYDROCHLORIDE 4 MG/1
4 CAPSULE, GELATIN COATED ORAL
COMMUNITY

## 2024-03-05 NOTE — PROGRESS NOTES
Patient ID: Niels Owens is a 53 y.o. male     Patient Care Team:  Guerrero Powell DO as PCP - General    Subjective     Chief Complaint   Patient presents with    Dizziness    Shortness of Breath       History of Present Illness    Niels Owens is a patient of Dr. Powell who presents to [facility] today for dizziness, fatigue, and shortness of breath.  Has not been seen per Dr. Powell since February 3, 2022 for physical.  Last blood work was completed at this time.    C/O worsening dizziness, shortness of breath, and fatigue.    Having periods of worsening anxiety due to stressor at work. Changed positions.   No change in diet or exercise.    Saturday, he felt light headed.  Went to bed at 1700 due to not feeling well.    Still having periods of not feeling well and lightheaded.  Feel he has had problems with elevated B/P due to having headache.  Did not check B/P at home.   Shortness of breath over past week.  Will get short of breath at times due to being out of shape.  However has worsened over past couple of week.  Does admit to not drinking plenty of fluids at times.  Non-smoker. No recent travel.  No surgeries.    Symptoms feel similar to when he was seen in ER in 2016 at [facility].  Work-up was normal.  Felt likely related to stressors.      Has been having flare up of sciatica.  Was taking meloxicam however took wife's indomethacin in which he felt worked better for him. Back pain better today.  Has had previous lumbar injections due to pain.  Last completed 9/7/2023.       He denies any complaints of fever, chills, cough, chest pain, abdominal pain, nausea, or any other concerns.     The following portions of the patient's history were reviewed and updated as appropriate: allergies, current medications, past family history, past medical history, past social history, past surgical history and problem list.       ROS    Vitals:    03/05/24 1040   Temp:    SpO2: 97%        Documented weights    03/05/24 1000   Weight: 108 kg (237 lb)     Body mass index is 33.07 kg/m².    Results for orders placed or performed in visit on 02/09/22   Hemoglobin A1c    Specimen: Blood   Result Value Ref Range    Hemoglobin A1C 5.8 (H) 4.8 - 5.6 %   Lipid panel    Specimen: Blood   Result Value Ref Range    Total Cholesterol 220 (H) 100 - 199 mg/dL    Triglycerides 227 (H) 0 - 149 mg/dL    HDL Cholesterol 43 >39 mg/dL    VLDL Cholesterol [place] 41 (H) 5 - 40 mg/dL    LDL Chol Calc (NIH) 136 (H) 0 - 99 mg/dL   TSH    Specimen: Blood   Result Value Ref Range    TSH 1.520 0.450 - 4.500 uIU/mL   Vitamin D 25 hydroxy    Specimen: Blood   Result Value Ref Range    25 Hydroxy, Vitamin D 22.7 (L) 30.0 - 100.0 ng/mL   PSA DIAGNOSTIC ONLY    Specimen: Blood   Result Value Ref Range    PSA 2.4 0.0 - 4.0 ng/mL   Comprehensive metabolic panel    Specimen: Blood   Result Value Ref Range    Glucose 102 (H) 65 - 99 mg/dL    BUN 13 6 - 24 mg/dL    Creatinine 1.11 0.76 - 1.27 mg/dL    eGFR Non African Am 77 >59 mL/min/1.73    eGFR African Am 89 >59 mL/min/1.73    BUN/Creatinine Ratio 12 9 - 20    Sodium 140 134 - 144 mmol/L    Potassium 4.8 3.5 - 5.2 mmol/L    Chloride 103 96 - 106 mmol/L    Total CO2 21 20 - 29 mmol/L    Calcium 9.6 8.7 - 10.2 mg/dL    Total Protein 7.3 6.0 - 8.5 g/dL    Albumin 4.2 4.0 - 5.0 g/dL    Globulin 3.1 1.5 - 4.5 g/dL    A/G Ratio 1.4 1.2 - 2.2    Total Bilirubin 0.4 0.0 - 1.2 mg/dL    Alkaline Phosphatase 56 44 - 121 IU/L    AST (SGOT) 21 0 - 40 IU/L    ALT (SGPT) 16 0 - 44 IU/L   CBC w AUTO Differential    Specimen: Blood   Result Value Ref Range    WBC 5.0 3.4 - 10.8 x10E3/uL    RBC 5.05 4.14 - 5.80 x10E6/uL    Hemoglobin 15.4 13.0 - 17.7 g/dL    Hematocrit 43.7 37.5 - 51.0 %    MCV 87 79 - 97 fL    MCH 30.5 26.6 - 33.0 pg    MCHC 35.2 31.5 - 35.7 g/dL    RDW 12.5 11.6 - 15.4 %    Platelets 225 150 - 450 x10E3/uL    Neutrophil Rel % 58 Not Estab. %    Lymphocyte Rel % 26 Not Estab. %     Monocyte Rel % 11 Not Estab. %    Eosinophil Rel % 4 Not Estab. %    Basophil Rel % 1 Not Estab. %    Neutrophils Absolute 2.9 1.4 - 7.0 x10E3/uL    Lymphocytes Absolute 1.3 0.7 - 3.1 x10E3/uL    Monocytes Absolute 0.5 0.1 - 0.9 x10E3/uL    Eosinophils Absolute 0.2 0.0 - 0.4 x10E3/uL    Basophils Absolute 0.0 0.0 - 0.2 x10E3/uL    Immature Granulocyte Rel % 0 Not Estab. %    Immature Grans Absolute 0.0 0.0 - 0.1 x10E3/uL           Objective     Physical Exam  Vitals reviewed.   Constitutional:       General: He is not in acute distress.     Appearance: He is well-developed.   HENT:      Head: Normocephalic and atraumatic.      Right Ear: Tympanic membrane normal.      Left Ear: Tympanic membrane normal.      Mouth/Throat:      Pharynx: No posterior oropharyngeal erythema.   Eyes:      Extraocular Movements: Extraocular movements intact.      Pupils: Pupils are equal, round, and reactive to light.   Cardiovascular:      Rate and Rhythm: Regular rhythm. Bradycardia present.      Heart sounds: Normal heart sounds. No murmur heard.     Comments: 54 bpm  Pulmonary:      Effort: Pulmonary effort is normal.      Breath sounds: Normal breath sounds. No wheezing, rhonchi or rales.   Abdominal:      General: Bowel sounds are normal.      Palpations: Abdomen is soft.      Tenderness: There is no abdominal tenderness.   Musculoskeletal:         General: No tenderness. Normal range of motion.      Cervical back: Normal range of motion and neck supple.      Right lower leg: No edema.      Left lower leg: No edema.   Lymphadenopathy:      Cervical: No cervical adenopathy.   Skin:     General: Skin is warm and dry.      Findings: No erythema or rash.   Neurological:      Mental Status: He is alert and oriented to person, place, and time.      Cranial Nerves: No cranial nerve deficit.      Motor: No weakness.   Psychiatric:         Mood and Affect: Mood normal.         Behavior: Behavior normal.       Vitals:    03/05/24 1000  24 1039 24 1040 24 1041   Orthostatic BP: 134/100 128/100 140/94 140/100   Orthostatic Pulse: 55 43 61 62   Patient Position: Lying Sitting Standing Sitting       ECG 12 Lead    Date/Time: 3/5/2024 1:58 PM  Performed by: Madelyn Saleem APRN    Authorized by: Madelyn Saleem APRN  Comparison: compared with previous ECG from 3/21/2016  Similar to previous ECG  Rhythm: sinus bradycardia  Rate: bradycardic  BPM: 54  ST Segments: ST segments normal  T Waves: T waves normal  QRS axis: left    Clinical impression: abnormal EKG  Clinical impression comment: artifact lead V5              Procedure Note    Deejay Turner MD - 2016  Formatting of this note might be different from the original.  ELECTROCARDIOGRAM REPORT    FACILITY:  [facility]  ROOM NUMBER:    PATIENT NAME/:  DANISH ENCARNACION J    1970  UNIT NUMBER:  [id number]  ACCOUNT NUMBER:  [id number]  ACCESSION NUMBER:  [id number]    DATE OF EXAM:  2016  EXAMINATION(S):  ECG 12-LEAD    ELECTROCARDIOGRAM REPORT  56827:    EKG Severity    TMV ECG  - ABNORMAL ECG -      89208.22    Heart Rate     TMV ECG    56    bmp  47703.4    P-R Interval   TMV ECG    140    ms  89932.5    QRS Interval    TMV ECG    100    ms  58856.6   QT Interval    TMV ECG    424    ms  57122.7    QTC Interval   TMV ECG    410    ms  35839.8    P Axis                     TMV ECG    12    deg  81116.9    QRS Axis       TMV ECG    -32    deg  91824.10    T Wave Axis    TMV ECG    25    deg    DATE: 2016    FINDING(S):SINUS BRADYCARDIA  NONSPECIFIC  INTRAVENTRICULAR CONDUCTION DELAY  LEFT ANTERIOR FASCICULAR BLOCK  NONSPECIFIC ST-T WAVE ABNORMALITIES          <Electronically signed by Deejay Turner>  2016 1428  Assessment & Plan     Assessment/Plan     Diagnoses and all orders for this visit:    1. Dizziness (Primary)  -     ECG 12 Lead  -     CBC (No Diff)  -     Comprehensive Metabolic Panel  -     TSH Rfx On Abnormal To Free T4  -      Vitamin D,25-Hydroxy  -     Vitamin B12 & Folate  -     SARS-CoV-2 Antibodies, Nucleocapsid (Natural Immunity)  -     XR Chest PA & Lateral    2. Lumbar radiculopathy  -     indomethacin (INDOCIN) 50 MG capsule; Take 1 capsule by mouth 3 (Three) Times a Day As Needed for Mild Pain or Moderate Pain.  Dispense: 30 capsule; Refill: 0    3. Fatigue, unspecified type  -     ECG 12 Lead  -     CBC (No Diff)  -     Comprehensive Metabolic Panel  -     TSH Rfx On Abnormal To Free T4  -     Vitamin D,25-Hydroxy  -     Vitamin B12 & Folate  -     SARS-CoV-2 Antibodies, Nucleocapsid (Natural Immunity)  -     XR Chest PA & Lateral    4. Shortness of breath  -     XR Chest PA & Lateral    5. Elevated blood pressure reading in office without diagnosis of hypertension   Monitor.  Awaiting lab results.  Limit salt intake.  Increase fluid intake.      Summary:  Niels Owens presented office today due to ongoing problems of dizziness, fatigue, and shortness of breath.  Had similar symptoms back in 2016 in which workup was negative.  Possibly symptoms related to increased stressors and anxiety.  EKG appears stable in office.  Blood pressure slightly elevated with elevated diastolic running in the 100s.  Heart rate has been from the 40s to 60s with orthostatic blood pressure readings.  Will check blood work today for further evaluation.  Need to increase fluid intake.  Also obtain chest x-ray for further evaluation all these results will determine further recommendations.    Follow Up:  Return for Labs and xray today - call results.  .    In the meantime, instructed to contact us sooner for any problems or concerns.    Patient was given instructions and counseling regarding condition or for health maintenance advice.  Please see specific information pulled into the AVS if appropriate.          Madelyn Saleem, APRN  Family Medicine  [facility]  03/05/24  14:02 EST

## 2024-03-06 LAB
25(OH)D3+25(OH)D2 SERPL-MCNC: 37 NG/ML (ref 30–100)
ALBUMIN SERPL-MCNC: 4.7 G/DL (ref 3.8–4.9)
ALBUMIN/GLOB SERPL: 1.5 {RATIO} (ref 1.2–2.2)
ALP SERPL-CCNC: 62 IU/L (ref 44–121)
ALT SERPL-CCNC: 15 IU/L (ref 0–44)
AST SERPL-CCNC: 26 IU/L (ref 0–40)
BILIRUB SERPL-MCNC: 0.6 MG/DL (ref 0–1.2)
BUN SERPL-MCNC: 20 MG/DL (ref 6–24)
BUN/CREAT SERPL: 19 (ref 9–20)
CALCIUM SERPL-MCNC: 9.9 MG/DL (ref 8.7–10.2)
CHLORIDE SERPL-SCNC: 102 MMOL/L (ref 96–106)
CO2 SERPL-SCNC: 21 MMOL/L (ref 20–29)
CREAT SERPL-MCNC: 1.07 MG/DL (ref 0.76–1.27)
EGFRCR SERPLBLD CKD-EPI 2021: 83 ML/MIN/1.73
ERYTHROCYTE [DISTWIDTH] IN BLOOD BY AUTOMATED COUNT: 12.8 % (ref 11.6–15.4)
FOLATE SERPL-MCNC: 15 NG/ML
GLOBULIN SER CALC-MCNC: 3.2 G/DL (ref 1.5–4.5)
GLUCOSE SERPL-MCNC: 88 MG/DL (ref 70–99)
HCT VFR BLD AUTO: 46.9 % (ref 37.5–51)
HGB BLD-MCNC: 16.4 G/DL (ref 13–17.7)
MCH RBC QN AUTO: 29.5 PG (ref 26.6–33)
MCHC RBC AUTO-ENTMCNC: 35 G/DL (ref 31.5–35.7)
MCV RBC AUTO: 85 FL (ref 79–97)
PLATELET # BLD AUTO: 223 X10E3/UL (ref 150–450)
POTASSIUM SERPL-SCNC: 5.2 MMOL/L (ref 3.5–5.2)
PROT SERPL-MCNC: 7.9 G/DL (ref 6–8.5)
RBC # BLD AUTO: 5.55 X10E6/UL (ref 4.14–5.8)
SARS-COV-2 AB SERPL QL IA: POSITIVE
SODIUM SERPL-SCNC: 139 MMOL/L (ref 134–144)
TSH SERPL DL<=0.005 MIU/L-ACNC: 1.81 UIU/ML (ref 0.45–4.5)
VIT B12 SERPL-MCNC: 644 PG/ML (ref 232–1245)
WBC # BLD AUTO: 5.5 X10E3/UL (ref 3.4–10.8)

## 2024-03-12 ENCOUNTER — OFFICE VISIT (OUTPATIENT)
Dept: FAMILY MEDICINE CLINIC | Facility: CLINIC | Age: 54
End: 2024-03-12
Payer: COMMERCIAL

## 2024-03-12 VITALS
TEMPERATURE: 97.5 F | SYSTOLIC BLOOD PRESSURE: 122 MMHG | HEART RATE: 64 BPM | WEIGHT: 241 LBS | HEIGHT: 71 IN | OXYGEN SATURATION: 95 % | BODY MASS INDEX: 33.74 KG/M2 | DIASTOLIC BLOOD PRESSURE: 80 MMHG

## 2024-03-12 DIAGNOSIS — R42 VERTIGO: Primary | ICD-10-CM

## 2024-03-12 PROBLEM — Z86.16 PERSONAL HISTORY OF COVID-19: Status: RESOLVED | Noted: 2022-02-21 | Resolved: 2024-03-12

## 2024-03-12 PROBLEM — M47.816 LUMBAR SPONDYLOSIS: Status: ACTIVE | Noted: 2022-11-29

## 2024-03-12 PROBLEM — M54.50 CHRONIC LOW BACK PAIN: Status: ACTIVE | Noted: 2022-11-29

## 2024-03-12 PROBLEM — M47.816 ARTHROPATHY OF LUMBAR FACET JOINT: Status: ACTIVE | Noted: 2022-11-29

## 2024-03-12 PROBLEM — M48.061 SPINAL STENOSIS OF LUMBAR REGION: Status: ACTIVE | Noted: 2022-11-29

## 2024-03-12 PROBLEM — M54.16 LUMBAR RADICULOPATHY: Status: ACTIVE | Noted: 2022-11-29

## 2024-03-12 PROBLEM — G89.29 CHRONIC LOW BACK PAIN: Status: ACTIVE | Noted: 2022-11-29

## 2024-03-12 PROBLEM — M25.552 PAIN OF LEFT HIP JOINT: Status: ACTIVE | Noted: 2022-11-29

## 2024-03-12 PROCEDURE — 99213 OFFICE O/P EST LOW 20 MIN: CPT | Performed by: FAMILY MEDICINE

## 2024-03-12 RX ORDER — MECLIZINE HYDROCHLORIDE 25 MG/1
25 TABLET ORAL 3 TIMES DAILY PRN
Qty: 30 TABLET | Refills: 0 | Status: SHIPPED | OUTPATIENT
Start: 2024-03-12

## 2024-03-12 NOTE — PROGRESS NOTES
"  Subjective   Niels Owens is a 53 y.o. male who is here for   Chief Complaint   Patient presents with    Dizziness    Nausea   .     History of Present Illness     Continued vertigo symptoms for about a month.  To sensation the room is spinning around.  No falls.  Never had vertigo before  No headaches head injury rash  Had same was seen in office a few weeks ago  Labs and EKG normal  Nausea hits when the vertigo hits  Usually worse at work when he is looking on both of his screens at work  URI a few weeks ago    The following portions of the patient's history were reviewed and updated as appropriate: allergies, current medications, past family history, past medical history, past social history, past surgical history, and problem list.    Review of Systems    Objective   Vitals:    03/12/24 1528   BP: 122/80   Pulse: 64   Temp: 97.5 °F (36.4 °C)   SpO2: 95%   Weight: 109 kg (241 lb)   Height: 180.3 cm (70.98\")      Physical Exam  Vitals reviewed.   HENT:      Head: Normocephalic and atraumatic.      Right Ear: Tympanic membrane, ear canal and external ear normal.      Left Ear: Tympanic membrane, ear canal and external ear normal.      Nose: Nose normal.   Eyes:      Extraocular Movements: Extraocular movements intact.      Conjunctiva/sclera: Conjunctivae normal.      Pupils: Pupils are equal, round, and reactive to light.   Neck:      Vascular: No carotid bruit.   Cardiovascular:      Rate and Rhythm: Normal rate.   Pulmonary:      Effort: Pulmonary effort is normal.   Lymphadenopathy:      Cervical: No cervical adenopathy.   Neurological:      General: No focal deficit present.      Mental Status: He is alert and oriented to person, place, and time.         Assessment & Plan   Diagnoses and all orders for this visit:    1. Vertigo (Primary)  -     MRI Brain Without Contrast; Future  -     meclizine (ANTIVERT) 25 MG tablet; Take 1 tablet by mouth 3 (Three) Times a Day As Needed for Dizziness.  Dispense: 30 " tablet; Refill: 0  -     Ambulatory Referral to ENT (Otolaryngology)      There are no Patient Instructions on file for this visit.    There are no discontinued medications.     No follow-ups on file.    Dr. Kranthi Abbott  [place], [place]

## 2024-05-20 ENCOUNTER — TELEPHONE (OUTPATIENT)
Dept: FAMILY MEDICINE CLINIC | Facility: CLINIC | Age: 54
End: 2024-05-20

## 2024-05-20 DIAGNOSIS — N40.0 BENIGN PROSTATIC HYPERPLASIA WITHOUT LOWER URINARY TRACT SYMPTOMS: ICD-10-CM

## 2024-05-20 DIAGNOSIS — Z00.00 HEALTHCARE MAINTENANCE: ICD-10-CM

## 2024-05-20 DIAGNOSIS — E55.9 VITAMIN D DEFICIENCY: Primary | ICD-10-CM

## 2024-05-20 DIAGNOSIS — R73.02 GLUCOSE INTOLERANCE (IMPAIRED GLUCOSE TOLERANCE): ICD-10-CM

## 2024-05-20 NOTE — TELEPHONE ENCOUNTER
Patient states that a lipid panel  was left off of his labs and would like to know if he can have it done or if he needs it.

## 2024-05-20 NOTE — TELEPHONE ENCOUNTER
Caller: Niels Owens    Relationship: Self    Best call back number: [phone]    What orders are you requesting (i.e. lab or imaging): LAB ORDER FOR LIPID PANEL     In what timeframe would the patient need to come in: ASAP     Where will you receive your lab/imaging services: IN OFFICE     Additional notes: PT STATES THAT A LIPID PANEL WAS LEFT OFF HIS LAST LABS AND WANTS TO KNOW IF HE CAN HAVE THAT DONE.

## 2024-05-24 ENCOUNTER — TELEPHONE (OUTPATIENT)
Dept: FAMILY MEDICINE CLINIC | Facility: CLINIC | Age: 54
End: 2024-05-24

## 2024-05-24 NOTE — TELEPHONE ENCOUNTER
Hub staff attempted to follow warm transfer process and was unsuccessful     Caller: Niels Owens    Relationship to patient: Self    Best call back number: [phone]     Patient is needing: TO RESCHEDULE LAB APPOINTMENT

## 2024-05-28 ENCOUNTER — TELEPHONE (OUTPATIENT)
Dept: FAMILY MEDICINE CLINIC | Facility: CLINIC | Age: 54
End: 2024-05-28
Payer: COMMERCIAL

## 2024-05-28 DIAGNOSIS — E55.9 VITAMIN D DEFICIENCY: ICD-10-CM

## 2024-05-28 DIAGNOSIS — Z00.00 HEALTHCARE MAINTENANCE: ICD-10-CM

## 2024-05-28 DIAGNOSIS — R73.02 GLUCOSE INTOLERANCE (IMPAIRED GLUCOSE TOLERANCE): ICD-10-CM

## 2024-05-28 DIAGNOSIS — N40.0 BENIGN PROSTATIC HYPERPLASIA WITHOUT LOWER URINARY TRACT SYMPTOMS: ICD-10-CM

## 2024-05-28 NOTE — TELEPHONE ENCOUNTER
Niels Roman is coming in for labs on 05/29/24, however he has not been seen since 02/03/22 by you. He was seen by Dr. Abbott on 03/12/24 and Madelyn Saleem on 03/05/24.  There is a PSA, Lipid and Hemoglobin A1C ordered, however I did not know what other  labs to order. Please Advise.

## 2024-05-29 DIAGNOSIS — Z00.00 HEALTHCARE MAINTENANCE: ICD-10-CM

## 2024-05-29 DIAGNOSIS — E55.9 VITAMIN D DEFICIENCY: Primary | ICD-10-CM

## 2024-05-29 DIAGNOSIS — N40.0 BENIGN PROSTATIC HYPERPLASIA WITHOUT LOWER URINARY TRACT SYMPTOMS: ICD-10-CM

## 2024-05-29 DIAGNOSIS — E66.09 EXOGENOUS OBESITY: ICD-10-CM

## 2024-05-29 DIAGNOSIS — R73.02 GLUCOSE INTOLERANCE (IMPAIRED GLUCOSE TOLERANCE): ICD-10-CM

## 2024-05-30 DIAGNOSIS — R97.20 ELEVATED PSA: Primary | ICD-10-CM

## 2024-05-30 LAB
CHOLEST SERPL-MCNC: 205 MG/DL (ref 0–200)
HBA1C MFR BLD: 5.6 % (ref 4.8–5.6)
HDLC SERPL-MCNC: 48 MG/DL (ref 40–60)
LDLC SERPL CALC-MCNC: 136 MG/DL (ref 0–100)
PSA SERPL-MCNC: 3.68 NG/ML (ref 0–4)
TRIGL SERPL-MCNC: 114 MG/DL (ref 0–150)
VLDLC SERPL CALC-MCNC: 21 MG/DL (ref 5–40)

## 2025-01-16 ENCOUNTER — OFFICE VISIT (OUTPATIENT)
Dept: FAMILY MEDICINE CLINIC | Facility: CLINIC | Age: 55
End: 2025-01-16
Payer: COMMERCIAL

## 2025-01-16 VITALS
TEMPERATURE: 97.3 F | OXYGEN SATURATION: 96 % | DIASTOLIC BLOOD PRESSURE: 90 MMHG | WEIGHT: 241.3 LBS | BODY MASS INDEX: 33.78 KG/M2 | HEIGHT: 71 IN | SYSTOLIC BLOOD PRESSURE: 130 MMHG | HEART RATE: 78 BPM

## 2025-01-16 DIAGNOSIS — R53.82 CHRONIC FATIGUE: ICD-10-CM

## 2025-01-16 DIAGNOSIS — R20.2 PARESTHESIA OF LEFT LOWER EXTREMITY: ICD-10-CM

## 2025-01-16 DIAGNOSIS — M54.42 ACUTE LEFT-SIDED LOW BACK PAIN WITH LEFT-SIDED SCIATICA: ICD-10-CM

## 2025-01-16 DIAGNOSIS — E78.2 MIXED HYPERLIPIDEMIA: ICD-10-CM

## 2025-01-16 DIAGNOSIS — R22.1 MASS OF SKIN OF NECK: ICD-10-CM

## 2025-01-16 DIAGNOSIS — R60.0 LOWER EXTREMITY EDEMA: ICD-10-CM

## 2025-01-16 DIAGNOSIS — S69.91XA INJURY OF RIGHT WRIST, INITIAL ENCOUNTER: Primary | ICD-10-CM

## 2025-01-16 DIAGNOSIS — E66.811 OBESITY (BMI 30.0-34.9): ICD-10-CM

## 2025-01-16 PROCEDURE — 99214 OFFICE O/P EST MOD 30 MIN: CPT

## 2025-01-16 RX ORDER — PREGABALIN 25 MG/1
25 CAPSULE ORAL 2 TIMES DAILY
Qty: 30 CAPSULE | Refills: 2 | Status: SHIPPED | OUTPATIENT
Start: 2025-01-16

## 2025-01-16 RX ORDER — MELOXICAM 15 MG/1
TABLET ORAL
COMMUNITY

## 2025-01-16 NOTE — PROGRESS NOTES
Patient or patient representative verbalized consent for the use of Ambient Listening during the visit with  KULDIP Gramajo for chart documentation. 1/16/2025  10:56 EST    Chief Complaint   Patient presents with    Back Pain       Subjective      Patient ID: Niels is a 54 y.o. male.     Chief Complaint   Patient presents with    Back Pain        Back Pain         History of Present Illness  The patient presents for evaluation of back pain, weight gain, elevated cholesterol, and wrist pain.    Hyperlipidemia - He was experiencing dizziness last year, coinciding with a change in his employment status and ongoing personal issues. He consulted with a cardiologist, Dr. Odom, who initiated a stress test and subsequent evaluation revealed mild cardiac disease and plaque accumulation. Consequently, he was prescribed a statin medication to manage the plaque, which he later discontinued due to potential side effects. Despite this, he maintained an active lifestyle, including regular exercise, and experienced weight loss. 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 His weight has increased from 220 to 240 pounds. He is considering the use of Ozempic for weight management and cholesterol control and is requesting a lipid panel to monitor its effectiveness.  He also mentions fatigue, and is interested in having a comprehensive laboratory panel assessed today.    Obesity - He has expressed interest in trying Ozempic for a month to assess its impact on his weight and cholesterol levels. He has been prescribed a statin medication but is hesitant to take it due to concerns about potential side effects. He reports no significant dietary changes but acknowledges eating late at night due to his schedule.     Discogenic low back pain with lumbar radiculopathy  - he has a history of herniated disc in the lumbar spine with sciatica,  which he attributes to his weight. He trialed some of his wife's Lyrica for his back pain with Lyrica 25 mg at night, which he finds effective. He also takes indomethacin but experiences stomach discomfort as a side effect. He has tried meloxicam and baclofen intermittently but prefers Lyrica for nighttime use. He has been experiencing numbness and tingling in his left leg, which has worsened with the recent cold weather. He rates his current pain level as 4 out of 10. He has a known herniated disc and has undergone imaging at [facility]. He has been receiving nerve blocks from [facility], but he found the last treatment was not effective. He also reports chronic bilateral lower leg swelling and suspects he may have arthritis.  Denies urinary or bowel incontinence.    Right wrist pain - he slipped on his parent's front porch last Wednesday and hit his hip and braced his fall with his right arm. He has been losing  strength and has difficulty holding objects with his right hand and is concerned about a wrist fracture. It was hurting initially, but it does not hurt much anymore. He would like to get an x-ray to double-check.    Mass beneath the skin of the posterior neck -he has a small bump under the skin of his posterior neck that he is wanting us to look at today.  Denies pain, drainage, erythema, or crusting.  Does endorse that it is increased slightly in size.    MEDICATIONS  Current: Lyrica, meloxicam, indomethacin, Excedrin       The following portions of the patient's history were reviewed and updated as appropriate: allergies, current medications, past family history, past medical history, past social history, past surgical history, and problem list.      Current Outpatient Medications:     indomethacin (INDOCIN) 50 MG capsule, Take 1 capsule by mouth 3 (Three) Times a Day As Needed for Mild Pain or Moderate Pain., Disp: 30 capsule, Rfl: 0    meloxicam (MOBIC) 15 MG tablet, TAKE 1 TABLET EVERY DAY BY  "ORAL ROUTE AS NEEDED., Disp: , Rfl:     TiZANidine (ZANAFLEX) 4 MG capsule, Take 1 capsule by mouth., Disp: , Rfl:     meclizine (ANTIVERT) 25 MG tablet, Take 1 tablet by mouth 3 (Three) Times a Day As Needed for Dizziness. (Patient not taking: Reported on 1/16/2025), Disp: 30 tablet, Rfl: 0    pregabalin (Lyrica) 25 MG capsule, Take 1 capsule by mouth 2 (Two) Times a Day. Indications: Neuropathic Pain, Disp: 30 capsule, Rfl: 2        Results  Laboratory Studies  Total cholesterol of 205. LDL of 136.        Objective      /90   Pulse 78   Temp 97.3 °F (36.3 °C) (Infrared)   Ht 180.3 cm (70.98\")   Wt 109 kg (241 lb 4.8 oz)   SpO2 96%   BMI 33.67 kg/m²      Body mass index is 33.67 kg/m².           Physical Exam  Vitals reviewed.   Constitutional:       General: He is not in acute distress.     Appearance: Normal appearance. He is obese.   Eyes:      Pupils: Pupils are equal, round, and reactive to light.   Neck:      Vascular: No carotid bruit or JVD.        Comments: Approx. 1cm round, non-fixed, mobile, rubbery lump under the skin of the posterior neck without drainage, erythema, or tenderness  Cardiovascular:      Rate and Rhythm: Normal rate and regular rhythm.      Pulses: Normal pulses.           Radial pulses are 2+ on the right side and 2+ on the left side.        Posterior tibial pulses are 2+ on the right side and 2+ on the left side.      Heart sounds: Normal heart sounds. No murmur heard.     No friction rub.      Comments: Nonpitting  Pulmonary:      Effort: Pulmonary effort is normal. No respiratory distress.      Breath sounds: Normal breath sounds. No wheezing or rales.   Musculoskeletal:      Right wrist: Tenderness (Mild tenderness with range of motion) present. No swelling, deformity, bony tenderness or snuff box tenderness. Normal range of motion. Normal pulse.      Cervical back: Neck supple.      Lumbar back: Tenderness and bony tenderness present. No edema or spasms. Normal range of " motion (Pain with range of motion with flexion, extension, and twisting). Positive left straight leg raise test. Negative right straight leg raise test.      Right lower le+ Edema present.      Left lower le+ Edema present.      Comments: Lumbar spinous and paraspinous tenderness present greater on the left   Neurological:      General: No focal deficit present.      Mental Status: He is alert.   Psychiatric:         Mood and Affect: Mood normal.         Behavior: Behavior normal.          Physical Exam           Assessment & Plan      Assessment & Plan       1. Injury of right wrist, initial encounter  He reports wrist pain following a fall. An x-ray of the wrist has been ordered to rule out any fractures.   - XR Wrist 3+ View Right; Future    2. Acute left-sided low back pain with left-sided sciatica  3. Paresthesia of left lower extremity  He has a known herniated disc and has undergone imaging at [facility]. He has been receiving nerve blocks from [facility], which have not been effective recently. He has been losing  strength and has difficulty holding objects. A prescription for Lyrica 25 mg has been issued for a duration of 30 days, with additional refills provided. He is advised to continue with meloxicam or indomethacin as tolerated.  Will obtain records of his previous lumbar imaging from [facility].  If the Lyrica and NSAIDs prove ineffective, a referral to [facility] will be considered for further treatment.  A comprehensive set of labs have been ordered.  - pregabalin (Lyrica) 25 MG capsule; Take 1 capsule by mouth 2 (Two) Times a Day. Indications: Neuropathic Pain  Dispense: 30 capsule; Refill: 2  - Vitamin B12; Future  - Folate; Future    4. Mixed hyperlipidemia  His last lipid panel showed a total cholesterol of 205 mg/dL and an LDL of 136 mg/dL, which are mildly elevated. A repeat lipid panel has been ordered to monitor his cholesterol levels. He is advised to  follow up with the results to discuss potential treatment options, including lifestyle modifications and medications if necessary.  - Lipid Panel; Future  - Hemoglobin A1c; Future  - CBC (No Diff); Future  - Comprehensive Metabolic Panel; Future  - TSH Rfx On Abnormal To Free T4; Future  - Vitamin D,25-Hydroxy; Future    5. Chronic fatigue  A comprehensive lab workup has been ordered, including testosterone levels, blood counts, vitamin levels, and vitamin D levels.   - Testosterone, Free, Total; Future    6. Obesity (BMI 30.0-34.9)  He has gained weight due to stress and caregiving responsibilities. He is advised to consider weight loss strategies, including dietary changes and increased physical activity. The potential use of Ozempic was discussed, but it is likely not covered by insurance without a diabetes diagnosis.  Will check an A1c today.    7. Lower extremity edema    Chronic, uncontrolled.  Will obtain imaging of the lumbar spine.  Comprehensive set of labs will be ordered today.    8. Mass of skin of neck   Discussed etiologies of mass under the skin of the posterior neck, including cyst or lipoma.  Richard likely benign nature of this mass as it is not causing any pain or decreased range of motion and has no drainage or redness.    Return in about 6 months (around 7/16/2025) for labs and xray today, return sooner as necessary following laboratory and imaging studies.       KULDIP Gramajo           Note to patient: The 21st Century Cures Act makes medical notes like these available to patients in the interest of transparency. However, be advised this is a medical document. It is intended as peer to peer communication. It is written in medical language and may contain abbreviations or verbiage that are unfamiliar. It may appear blunt or direct. Medical documents are intended to carry relevant information, facts as evident, and the clinical opinion of the practitioner.

## 2025-01-19 LAB
25(OH)D3+25(OH)D2 SERPL-MCNC: 26.1 NG/ML (ref 30–100)
ALBUMIN SERPL-MCNC: 4.1 G/DL (ref 3.5–5.2)
ALBUMIN/GLOB SERPL: 1.2 G/DL
ALP SERPL-CCNC: 57 U/L (ref 39–117)
ALT SERPL-CCNC: 18 U/L (ref 1–41)
AST SERPL-CCNC: 28 U/L (ref 1–40)
BILIRUB SERPL-MCNC: 0.4 MG/DL (ref 0–1.2)
BUN SERPL-MCNC: 15 MG/DL (ref 6–20)
BUN/CREAT SERPL: 15.3 (ref 7–25)
CALCIUM SERPL-MCNC: 9.6 MG/DL (ref 8.6–10.5)
CHLORIDE SERPL-SCNC: 103 MMOL/L (ref 98–107)
CHOLEST SERPL-MCNC: 206 MG/DL (ref 0–200)
CO2 SERPL-SCNC: 25 MMOL/L (ref 22–29)
CREAT SERPL-MCNC: 0.98 MG/DL (ref 0.76–1.27)
EGFRCR SERPLBLD CKD-EPI 2021: 92.2 ML/MIN/1.73
ERYTHROCYTE [DISTWIDTH] IN BLOOD BY AUTOMATED COUNT: 13.1 % (ref 12.3–15.4)
FOLATE SERPL-MCNC: 6.31 NG/ML (ref 4.78–24.2)
GLOBULIN SER CALC-MCNC: 3.4 GM/DL
GLUCOSE SERPL-MCNC: 83 MG/DL (ref 65–99)
HBA1C MFR BLD: 5.6 % (ref 4.8–5.6)
HCT VFR BLD AUTO: 45.9 % (ref 37.5–51)
HDLC SERPL-MCNC: 38 MG/DL (ref 40–60)
HGB BLD-MCNC: 15.8 G/DL (ref 13–17.7)
LDLC SERPL CALC-MCNC: 134 MG/DL (ref 0–100)
MCH RBC QN AUTO: 30 PG (ref 26.6–33)
MCHC RBC AUTO-ENTMCNC: 34.4 G/DL (ref 31.5–35.7)
MCV RBC AUTO: 87.3 FL (ref 79–97)
PLATELET # BLD AUTO: 236 10*3/MM3 (ref 140–450)
POTASSIUM SERPL-SCNC: 4.7 MMOL/L (ref 3.5–5.2)
PROT SERPL-MCNC: 7.5 G/DL (ref 6–8.5)
RBC # BLD AUTO: 5.26 10*6/MM3 (ref 4.14–5.8)
SODIUM SERPL-SCNC: 138 MMOL/L (ref 136–145)
TESTOST FREE SERPL-MCNC: 6.7 PG/ML (ref 7.2–24)
TESTOST SERPL-MCNC: 687 NG/DL (ref 264–916)
TRIGL SERPL-MCNC: 191 MG/DL (ref 0–150)
TSH SERPL DL<=0.005 MIU/L-ACNC: 1.89 UIU/ML (ref 0.27–4.2)
VIT B12 SERPL-MCNC: 594 PG/ML (ref 211–946)
VLDLC SERPL CALC-MCNC: 34 MG/DL (ref 5–40)
WBC # BLD AUTO: 5.59 10*3/MM3 (ref 3.4–10.8)

## 2025-01-20 ENCOUNTER — TELEPHONE (OUTPATIENT)
Dept: FAMILY MEDICINE CLINIC | Facility: CLINIC | Age: 55
End: 2025-01-20
Payer: COMMERCIAL

## 2025-01-20 NOTE — TELEPHONE ENCOUNTER
Name: Roman Niels WILLIAM    Relationship: Self    Best Callback Number: [phone]     [facility]UB PROVIDED THE RELAY MESSAGE FROM THE OFFICE   PATIENT VOICED UNDERSTANDING AND HAS NO FURTHER QUESTIONS AT THIS TIME    ADDITIONAL INFORMATION:

## 2025-01-20 NOTE — TELEPHONE ENCOUNTER
Unable to reach pt. [facility] to discuss results.    PLEASE SHARE MESSAGE BELOW:    Your labs are overall stable.  Your total cholesterol remains stable at 206, the previous level was 205.  Our goal is less than 200.  Your triglycerides are elevated as well at 191, our goal is less than 150.  And your LDL is 134, and we want that less than 100.  Overall these results are similar from your previous cholesterol screenings.  Your treatment recommendation right now is continued lifestyle modification with a diet low in saturated fats, carbohydrates, and sugars.  Weight loss can also help with this.  Your vitamin levels for B12 and folate are normal, your thyroid function is normal, your kidney and liver function are normal, your blood counts are normal, and your A1c, or average blood sugar over the last 3 months, is normal.  Your total to testosterone is normal, your free testosterone is very mildly low, but this is not considered an abnormal result considering the total testosterone.  You do have low vitamin D levels, which does contribute to fatigue.  I recommend that you start supplementing with a vitamin D over-the-counter supplement with 2000 IUs daily.   Yes

## 2025-03-27 ENCOUNTER — TELEMEDICINE (OUTPATIENT)
Dept: FAMILY MEDICINE CLINIC | Facility: CLINIC | Age: 55
End: 2025-03-27
Payer: COMMERCIAL

## 2025-03-27 DIAGNOSIS — J01.00 ACUTE NON-RECURRENT MAXILLARY SINUSITIS: Primary | ICD-10-CM

## 2025-03-27 RX ORDER — AMOXICILLIN 500 MG/1
1000 CAPSULE ORAL 2 TIMES DAILY
Qty: 28 CAPSULE | Refills: 0 | Status: SHIPPED | OUTPATIENT
Start: 2025-03-27

## 2025-03-27 NOTE — PROGRESS NOTES
"Niels Owens is a 54 y.o. who presents today for an E-visit with complaints of sinus pressure and pain.     You have chosen to receive care through a telehealth visit.  Do you consent to use a video/audio connection for your medical care today? Yes    Niels Owens was located at their residence.  KULDIP Gramajo was located at [facility] office    Participants:  Patient and provider    Start time:  1515   End time:  1526  Time spent caring for the patient was 11 - 20 min.       Patient or patient representative verbalized consent for the use of Ambient Listening during the visit with  KULDIP Gramajo for chart documentation. 3/27/2025  16:44 EDT    Chief Complaint   Patient presents with    URI       Subjective      Patient ID: Niels Shafer" is a 54 y.o. male.     Chief Complaint   Patient presents with    URI        URI          History of Present Illness  The patient presents via virtual visit for evaluation of a sinus infection.    He reports a recent travel history, having left [facility] on Tuesday and arriving in [place] on Wednesday. During this period, he experienced a severe scratchy throat, which was followed by intense sinus drainage. Despite using an OTC medicine similar to Dayquil for relief during his flight from [place] to [place], he continued to experience constant sinus drainage. Upon his return a week ago, he noticed a shift in symptoms from profuse sinus drainage to a dry sensation, with the discomfort now localized more towards the head and sinuses. He has been unable to work due to these symptoms. He also reports mild tenderness when applying pressure to his sinuses. His cough is not productive, but he does experience some relief after coughing up phlegm in the morning. He recalls feeling feverish upon waking up this morning, although his temperature did not register as elevated. He also reports mild nausea but no vomiting or diarrhea. He tried Zyrtec recently, " which provided some relief at night. He has Flonase nasal spray and Mucinex at home. He has a past medical history of pneumonia, which was associated with severe coughing and hemoptysis but denies those currently.    ALLERGIES  The patient has no known allergies.    MEDICATIONS  Current: Zyrtec, Flonase, Mucinex       The following portions of the patient's history were reviewed and updated as appropriate: allergies, current medications, past family history, past medical history, past social history, past surgical history, and problem list.      Current Outpatient Medications:     amoxicillin (AMOXIL) 500 MG capsule, Take 2 capsules by mouth 2 (Two) Times a Day., Disp: 28 capsule, Rfl: 0    indomethacin (INDOCIN) 50 MG capsule, Take 1 capsule by mouth 3 (Three) Times a Day As Needed for Mild Pain or Moderate Pain., Disp: 30 capsule, Rfl: 0    meclizine (ANTIVERT) 25 MG tablet, Take 1 tablet by mouth 3 (Three) Times a Day As Needed for Dizziness. (Patient not taking: Reported on 1/16/2025), Disp: 30 tablet, Rfl: 0    meloxicam (MOBIC) 15 MG tablet, TAKE 1 TABLET EVERY DAY BY ORAL ROUTE AS NEEDED., Disp: , Rfl:     pregabalin (Lyrica) 25 MG capsule, Take 1 capsule by mouth 2 (Two) Times a Day. Indications: Neuropathic Pain, Disp: 30 capsule, Rfl: 2    TiZANidine (ZANAFLEX) 4 MG capsule, Take 1 capsule by mouth., Disp: , Rfl:         Results          Objective      There were no vitals taken for this visit.     There is no height or weight on file to calculate BMI.           Physical Exam  Constitutional:       Appearance: Normal appearance. He is ill-appearing (mildly).   HENT:      Nose:      Right Sinus: Maxillary sinus tenderness present.      Left Sinus: Maxillary sinus tenderness present.   Eyes:      Pupils: Pupils are equal, round, and reactive to light.   Pulmonary:      Effort: Pulmonary effort is normal. No respiratory distress.   Musculoskeletal:      Cervical back: Neck supple.   Neurological:       General: No focal deficit present.      Mental Status: He is alert.   Psychiatric:         Mood and Affect: Mood normal.         Behavior: Behavior normal.          Physical Exam          Assessment & Plan        Assessment & Plan       1. Acute non-recurrent maxillary sinusitis  The patient's symptoms, including sinus tenderness, headache, and chest discomfort, suggest a sinus infection. He is advised to use over-the-counter medications such as Zyrtec, Flonase nasal spray, and Mucinex to manage symptoms. Additional recommendations include steam showers, warm tea with honey, and elevating the head during sleep to alleviate sinus pressure. A prescription for amoxicillin 1000 mg twice daily for 7 days will be sent to [facility]. He is instructed to complete the full course of antibiotics even if symptoms improve. If symptoms worsen or move into the chest, he should notify the provider immediately.  - amoxicillin (AMOXIL) 500 MG capsule; Take 2 capsules by mouth 2 (Two) Times a Day.  Dispense: 28 capsule; Refill: 0       Return if symptoms worsen or fail to improve.       KULDIP Gramajo           Note to patient: The 21st Century Cures Act makes medical notes like these available to patients in the interest of transparency. However, be advised this is a medical document. It is intended as peer to peer communication. It is written in medical language and may contain abbreviations or verbiage that are unfamiliar. It may appear blunt or direct. Medical documents are intended to carry relevant information, facts as evident, and the clinical opinion of the practitioner.

## 2025-06-02 ENCOUNTER — TELEPHONE (OUTPATIENT)
Dept: FAMILY MEDICINE CLINIC | Facility: CLINIC | Age: 55
End: 2025-06-02
Payer: COMMERCIAL

## 2025-06-02 NOTE — TELEPHONE ENCOUNTER
HUB TO RELAY:   Tried to call patient regarding their appointment with Dr. Powell on 06/26/25.   We are needing to reschedule due to the provider being out of the office.      IF THE PATIENT HAS LABS:  *As long as patient reschedules within a 2-3 week period of their labs, the lab appointment may stay the same.      Patient did not answer, LVM.

## 2025-06-18 DIAGNOSIS — E66.09 EXOGENOUS OBESITY: ICD-10-CM

## 2025-06-18 DIAGNOSIS — Z13.220 ENCOUNTER FOR LIPID SCREENING FOR CARDIOVASCULAR DISEASE: ICD-10-CM

## 2025-06-18 DIAGNOSIS — Z12.5 SCREENING FOR PROSTATE CANCER: ICD-10-CM

## 2025-06-18 DIAGNOSIS — E55.9 VITAMIN D DEFICIENCY: Primary | ICD-10-CM

## 2025-06-18 DIAGNOSIS — R73.02 GLUCOSE INTOLERANCE (IMPAIRED GLUCOSE TOLERANCE): ICD-10-CM

## 2025-06-18 DIAGNOSIS — Z13.29 SCREENING FOR THYROID DISORDER: ICD-10-CM

## 2025-06-18 DIAGNOSIS — Z13.6 ENCOUNTER FOR LIPID SCREENING FOR CARDIOVASCULAR DISEASE: ICD-10-CM

## 2025-06-21 LAB
25(OH)D3+25(OH)D2 SERPL-MCNC: 43.8 NG/ML (ref 30–100)
ALBUMIN SERPL-MCNC: 4.2 G/DL (ref 3.8–4.9)
ALP SERPL-CCNC: 61 IU/L (ref 44–121)
ALT SERPL-CCNC: 14 IU/L (ref 0–44)
AST SERPL-CCNC: 23 IU/L (ref 0–40)
BASOPHILS # BLD AUTO: 0 X10E3/UL (ref 0–0.2)
BASOPHILS NFR BLD AUTO: 1 %
BILIRUB SERPL-MCNC: 0.5 MG/DL (ref 0–1.2)
BUN SERPL-MCNC: 17 MG/DL (ref 6–24)
BUN/CREAT SERPL: 18 (ref 9–20)
CALCIUM SERPL-MCNC: 9.3 MG/DL (ref 8.7–10.2)
CHLORIDE SERPL-SCNC: 104 MMOL/L (ref 96–106)
CHOLEST SERPL-MCNC: 215 MG/DL (ref 100–199)
CO2 SERPL-SCNC: 20 MMOL/L (ref 20–29)
CREAT SERPL-MCNC: 0.96 MG/DL (ref 0.76–1.27)
EGFRCR SERPLBLD CKD-EPI 2021: 95 ML/MIN/1.73
EOSINOPHIL # BLD AUTO: 0.2 X10E3/UL (ref 0–0.4)
EOSINOPHIL NFR BLD AUTO: 5 %
ERYTHROCYTE [DISTWIDTH] IN BLOOD BY AUTOMATED COUNT: 12.6 % (ref 11.6–15.4)
GLOBULIN SER CALC-MCNC: 3.2 G/DL (ref 1.5–4.5)
GLUCOSE SERPL-MCNC: 94 MG/DL (ref 70–99)
HBA1C MFR BLD: 5.6 % (ref 4.8–5.6)
HCT VFR BLD AUTO: 48.2 % (ref 37.5–51)
HDLC SERPL-MCNC: 42 MG/DL
HGB BLD-MCNC: 15.7 G/DL (ref 13–17.7)
IMM GRANULOCYTES # BLD AUTO: 0 X10E3/UL (ref 0–0.1)
IMM GRANULOCYTES NFR BLD AUTO: 1 %
LDLC SERPL CALC-MCNC: 140 MG/DL (ref 0–99)
LYMPHOCYTES # BLD AUTO: 1.5 X10E3/UL (ref 0.7–3.1)
LYMPHOCYTES NFR BLD AUTO: 29 %
MCH RBC QN AUTO: 29.2 PG (ref 26.6–33)
MCHC RBC AUTO-ENTMCNC: 32.6 G/DL (ref 31.5–35.7)
MCV RBC AUTO: 90 FL (ref 79–97)
MONOCYTES # BLD AUTO: 0.5 X10E3/UL (ref 0.1–0.9)
MONOCYTES NFR BLD AUTO: 10 %
NEUTROPHILS # BLD AUTO: 2.8 X10E3/UL (ref 1.4–7)
NEUTROPHILS NFR BLD AUTO: 54 %
PLATELET # BLD AUTO: 214 X10E3/UL (ref 150–450)
POTASSIUM SERPL-SCNC: 4.9 MMOL/L (ref 3.5–5.2)
PROT SERPL-MCNC: 7.4 G/DL (ref 6–8.5)
PSA SERPL-MCNC: 1.8 NG/ML (ref 0–4)
RBC # BLD AUTO: 5.38 X10E6/UL (ref 4.14–5.8)
SODIUM SERPL-SCNC: 138 MMOL/L (ref 134–144)
TRIGL SERPL-MCNC: 185 MG/DL (ref 0–149)
TSH SERPL DL<=0.005 MIU/L-ACNC: 1.72 UIU/ML (ref 0.45–4.5)
VLDLC SERPL CALC-MCNC: 33 MG/DL (ref 5–40)
WBC # BLD AUTO: 5.2 X10E3/UL (ref 3.4–10.8)

## 2025-06-23 ENCOUNTER — OFFICE VISIT (OUTPATIENT)
Dept: FAMILY MEDICINE CLINIC | Facility: CLINIC | Age: 55
End: 2025-06-23
Payer: COMMERCIAL

## 2025-06-23 VITALS
SYSTOLIC BLOOD PRESSURE: 120 MMHG | WEIGHT: 238.3 LBS | HEIGHT: 71 IN | OXYGEN SATURATION: 98 % | DIASTOLIC BLOOD PRESSURE: 82 MMHG | TEMPERATURE: 98 F | HEART RATE: 62 BPM | BODY MASS INDEX: 33.36 KG/M2

## 2025-06-23 DIAGNOSIS — M54.32 SCIATICA OF LEFT SIDE: ICD-10-CM

## 2025-06-23 DIAGNOSIS — R73.02 GLUCOSE INTOLERANCE (IMPAIRED GLUCOSE TOLERANCE): ICD-10-CM

## 2025-06-23 DIAGNOSIS — E78.2 MIXED HYPERLIPIDEMIA: Primary | ICD-10-CM

## 2025-06-23 DIAGNOSIS — E66.09 EXOGENOUS OBESITY: ICD-10-CM

## 2025-06-23 DIAGNOSIS — H90.0 CONDUCTIVE HEARING LOSS, BILATERAL: ICD-10-CM

## 2025-06-23 DIAGNOSIS — M54.42 ACUTE LEFT-SIDED LOW BACK PAIN WITH LEFT-SIDED SCIATICA: ICD-10-CM

## 2025-06-23 DIAGNOSIS — H93.13 TINNITUS OF BOTH EARS: ICD-10-CM

## 2025-06-23 PROCEDURE — 99214 OFFICE O/P EST MOD 30 MIN: CPT | Performed by: FAMILY MEDICINE

## 2025-06-23 RX ORDER — PREGABALIN 25 MG/1
25 CAPSULE ORAL 2 TIMES DAILY
Qty: 90 CAPSULE | Refills: 1 | Status: SHIPPED | OUTPATIENT
Start: 2025-06-23

## 2025-06-24 NOTE — PROGRESS NOTES
Subjective   Niels Owens is a 54 y.o. male with   Chief Complaint   Patient presents with    Obesity     Labs prior    Vitamin D Deficiency    Glucose intolerance    Tinnitus     X 4-5 months   .    History of Present Illness   54-year-old white male with multiple medical issues here for further medical management.  Patient with known history of hyperlipidemia, exogenous obesity as well as glucose intolerance and vitamin D deficiency.  He has also had tenderness over the last 4 to 5 months.  He is requesting ENT referral.  He has been evaluated by audiology in the past who did not offer him any further treatment.  They did discuss hearing aids although he was left to believe that they would probably not help.  Current medications include Lyrica used on an as-needed basis for sciatica as well as meloxicam, tizanidine and meclizine on an as-needed basis.  All medications are used appropriately and well-tolerated without side effects.  Fasting labs have been acquired prior to this appointment.  The following portions of the patient's history were reviewed and updated as appropriate: allergies, current medications, past family history, past medical history, past social history, past surgical history and problem list.    Review of Systems   HENT:  Positive for hearing loss and tinnitus.    Cardiovascular:         Hyperlipidemia   Musculoskeletal:  Positive for arthralgias and back pain.   Neurological:         Lumbar radiculopathy       Objective     Vitals:    06/23/25 1503   BP: 120/82   Pulse: 62   Temp: 98 °F (36.7 °C)   SpO2: 98%       Recent Results (from the past 4 weeks)   Comprehensive Metabolic Panel    Collection Time: 06/20/25  9:05 AM    Specimen: Blood   Result Value Ref Range    Glucose 94 70 - 99 mg/dL    BUN 17 6 - 24 mg/dL    Creatinine 0.96 0.76 - 1.27 mg/dL    EGFR Result 95 >59 mL/min/1.73    BUN/Creatinine Ratio 18 9 - 20    Sodium 138 134 - 144 mmol/L    Potassium 4.9 3.5 - 5.2 mmol/L     Chloride 104 96 - 106 mmol/L    Total CO2 20 20 - 29 mmol/L    Calcium 9.3 8.7 - 10.2 mg/dL    Total Protein 7.4 6.0 - 8.5 g/dL    Albumin 4.2 3.8 - 4.9 g/dL    Globulin 3.2 1.5 - 4.5 g/dL    Total Bilirubin 0.5 0.0 - 1.2 mg/dL    Alkaline Phosphatase 61 44 - 121 IU/L    AST (SGOT) 23 0 - 40 IU/L    ALT (SGPT) 14 0 - 44 IU/L   TSH    Collection Time: 06/20/25  9:05 AM    Specimen: Blood   Result Value Ref Range    TSH 1.720 0.450 - 4.500 uIU/mL   Vitamin D,25-Hydroxy    Collection Time: 06/20/25  9:05 AM    Specimen: Blood   Result Value Ref Range    25 Hydroxy, Vitamin D 43.8 30.0 - 100.0 ng/mL   Lipid Panel    Collection Time: 06/20/25  9:05 AM    Specimen: Blood   Result Value Ref Range    Total Cholesterol 215 (H) 100 - 199 mg/dL    Triglycerides 185 (H) 0 - 149 mg/dL    HDL Cholesterol 42 >39 mg/dL    VLDL Cholesterol [place] 33 5 - 40 mg/dL    LDL Chol Calc (NIH) 140 (H) 0 - 99 mg/dL   Hemoglobin A1c    Collection Time: 06/20/25  9:05 AM    Specimen: Blood   Result Value Ref Range    Hemoglobin A1C 5.6 4.8 - 5.6 %   CBC & Differential    Collection Time: 06/20/25  9:05 AM    Specimen: Blood   Result Value Ref Range    WBC 5.2 3.4 - 10.8 x10E3/uL    RBC 5.38 4.14 - 5.80 x10E6/uL    Hemoglobin 15.7 13.0 - 17.7 g/dL    Hematocrit 48.2 37.5 - 51.0 %    MCV 90 79 - 97 fL    MCH 29.2 26.6 - 33.0 pg    MCHC 32.6 31.5 - 35.7 g/dL    RDW 12.6 11.6 - 15.4 %    Platelets 214 150 - 450 x10E3/uL    Neutrophil Rel % 54 Not Estab. %    Lymphocyte Rel % 29 Not Estab. %    Monocyte Rel % 10 Not Estab. %    Eosinophil Rel % 5 Not Estab. %    Basophil Rel % 1 Not Estab. %    Neutrophils Absolute 2.8 1.4 - 7.0 x10E3/uL    Lymphocytes Absolute 1.5 0.7 - 3.1 x10E3/uL    Monocytes Absolute 0.5 0.1 - 0.9 x10E3/uL    Eosinophils Absolute 0.2 0.0 - 0.4 x10E3/uL    Basophils Absolute 0.0 0.0 - 0.2 x10E3/uL    Immature Granulocyte Rel % 1 Not Estab. %    Immature Grans Absolute 0.0 0.0 - 0.1 x10E3/uL   PSA DIAGNOSTIC    Collection Time:  06/20/25  9:05 AM    Specimen: Blood   Result Value Ref Range    PSA 1.8 0.0 - 4.0 ng/mL       Physical Exam  Vitals and nursing note reviewed.   Constitutional:       Appearance: Normal appearance. He is well-developed and well-groomed. He is obese.      Comments: Exogenous obesity with a BMI of 33.3   HENT:      Head: Normocephalic and atraumatic.   Neck:      Thyroid: No thyroid mass or thyromegaly.      Vascular: Normal carotid pulses. No carotid bruit.      Trachea: Trachea and phonation normal.   Cardiovascular:      Rate and Rhythm: Normal rate and regular rhythm.      Heart sounds: Normal heart sounds. No murmur heard.     No friction rub. No gallop.   Pulmonary:      Effort: Pulmonary effort is normal. No respiratory distress.      Breath sounds: Normal breath sounds. No decreased breath sounds, wheezing, rhonchi or rales.   Musculoskeletal:      Cervical back: Neck supple.   Lymphadenopathy:      Cervical: No cervical adenopathy.   Skin:     General: Skin is warm and dry.      Findings: No rash.   Neurological:      Mental Status: He is alert and oriented to person, place, and time.   Psychiatric:         Attention and Perception: Attention and perception normal.         Mood and Affect: Mood and affect normal.         Speech: Speech normal.         Behavior: Behavior normal. Behavior is cooperative.         Thought Content: Thought content normal.         Cognition and Memory: Cognition and memory normal.         Judgment: Judgment normal.         Assessment & Plan   Diagnoses and all orders for this visit:    1. Mixed hyperlipidemia (Primary)  -     Lipid panel; Future  -     Comprehensive metabolic panel; Future  -     TSH; Future  -     Vitamin D 25 hydroxy; Future  -     CBC w AUTO Differential; Future  -     Hemoglobin A1c; Future    2. Exogenous obesity  -     Lipid panel; Future  -     Comprehensive metabolic panel; Future  -     TSH; Future  -     Vitamin D 25 hydroxy; Future  -     CBC w AUTO  Differential; Future  -     Hemoglobin A1c; Future    3. Sciatica of left side  -     Lipid panel; Future  -     Comprehensive metabolic panel; Future  -     TSH; Future  -     Vitamin D 25 hydroxy; Future  -     CBC w AUTO Differential; Future  -     Hemoglobin A1c; Future    4. Tinnitus of both ears  -     Ambulatory Referral to ENT (Otolaryngology)  -     Lipid panel; Future  -     Comprehensive metabolic panel; Future  -     TSH; Future  -     Vitamin D 25 hydroxy; Future  -     CBC w AUTO Differential; Future  -     Hemoglobin A1c; Future    5. Conductive hearing loss, bilateral  -     Ambulatory Referral to ENT (Otolaryngology)  -     Lipid panel; Future  -     Comprehensive metabolic panel; Future  -     TSH; Future  -     Vitamin D 25 hydroxy; Future  -     CBC w AUTO Differential; Future  -     Hemoglobin A1c; Future    6. Acute left-sided low back pain with left-sided sciatica  -     pregabalin (Lyrica) 25 MG capsule; Take 1 capsule by mouth 2 (Two) Times a Day. Indications: Neuropathic Pain  Dispense: 90 capsule; Refill: 1  -     Lipid panel; Future  -     Comprehensive metabolic panel; Future  -     TSH; Future  -     Vitamin D 25 hydroxy; Future  -     CBC w AUTO Differential; Future  -     Hemoglobin A1c; Future    7. Glucose intolerance (impaired glucose tolerance)  -     Lipid panel; Future  -     Comprehensive metabolic panel; Future  -     TSH; Future  -     Vitamin D 25 hydroxy; Future  -     CBC w AUTO Differential; Future  -     Hemoglobin A1c; Future    Patient has been asked to lower cholesterol in his diet with an LDL goal of less than 130.  He will be referred at his request to ENT for their evaluation of his tinnitus and hearing loss.  Refill of Lyrica will take place in regards to his sciatic nerve issue.  Anticipate repeat fasting labs in 6 months unless otherwise indicated.    Return in about 6 months (around 12/23/2025), or if symptoms worsen or fail to improve, for Recheck.  BMI is >=  30 and <35. (Class 1 Obesity). The following options were offered after discussion;: exercise counseling/recommendations and nutrition counseling/recommendations